# Patient Record
Sex: FEMALE | Race: BLACK OR AFRICAN AMERICAN | NOT HISPANIC OR LATINO | Employment: UNEMPLOYED | URBAN - METROPOLITAN AREA
[De-identification: names, ages, dates, MRNs, and addresses within clinical notes are randomized per-mention and may not be internally consistent; named-entity substitution may affect disease eponyms.]

---

## 2020-06-15 ENCOUNTER — HOSPITAL ENCOUNTER (EMERGENCY)
Facility: CLINIC | Age: 40
Discharge: HOME | End: 2020-06-15
Attending: EMERGENCY MEDICINE | Admitting: EMERGENCY MEDICINE
Payer: COMMERCIAL

## 2020-06-15 ENCOUNTER — APPOINTMENT (OUTPATIENT)
Dept: GENERAL RADIOLOGY | Facility: CLINIC | Age: 40
End: 2020-06-15
Attending: EMERGENCY MEDICINE
Payer: COMMERCIAL

## 2020-06-15 VITALS
SYSTOLIC BLOOD PRESSURE: 102 MMHG | TEMPERATURE: 97.8 F | DIASTOLIC BLOOD PRESSURE: 52 MMHG | RESPIRATION RATE: 16 BRPM | HEART RATE: 62 BPM | OXYGEN SATURATION: 100 %

## 2020-06-15 DIAGNOSIS — M79.671 RIGHT FOOT PAIN: ICD-10-CM

## 2020-06-15 PROCEDURE — 73630 X-RAY EXAM OF FOOT: CPT | Mod: RT

## 2020-06-15 PROCEDURE — 99283 EMERGENCY DEPT VISIT LOW MDM: CPT | Mod: Z6 | Performed by: EMERGENCY MEDICINE

## 2020-06-15 PROCEDURE — 99283 EMERGENCY DEPT VISIT LOW MDM: CPT

## 2020-06-15 RX ORDER — IBUPROFEN 600 MG/1
600 TABLET, FILM COATED ORAL EVERY 6 HOURS PRN
Qty: 20 TABLET | Refills: 0 | Status: SHIPPED | OUTPATIENT
Start: 2020-06-15 | End: 2022-11-07

## 2020-06-15 ASSESSMENT — ENCOUNTER SYMPTOMS
COLOR CHANGE: 0
ABDOMINAL PAIN: 0
NECK STIFFNESS: 0
DIFFICULTY URINATING: 0
EYE REDNESS: 0
ARTHRALGIAS: 0
SHORTNESS OF BREATH: 0
FEVER: 0
CONFUSION: 0
HEADACHES: 0

## 2020-06-15 NOTE — ED AVS SNAPSHOT
[facility], [place], Emergency Department  [address]  [facility] [place] [address]  Phone:  [phone]  Fax:  [phone]                                    Nadia Egan   MRN: [id number]    Department:  [facility], Emergency Department   Date of Visit:  6/15/2020           After Visit Summary Signature Page    I have received my discharge instructions, and my questions have been answered. I have discussed any challenges I see with this plan with the nurse or doctor.    ..........................................................................................................................................  Patient/Patient Representative Signature      ..........................................................................................................................................  Patient Representative Print Name and Relationship to Patient    ..................................................               ................................................  Date                                   Time    ..........................................................................................................................................  Reviewed by Signature/Title    ...................................................              ..............................................  Date                                               Time          22EPIC Rev 08/18

## 2020-06-15 NOTE — ED TRIAGE NOTES
Pt complaining of mid right foot pain [place].  Pt states no known injury.

## 2020-06-15 NOTE — ED PROVIDER NOTES
[facility] EMERGENCY DEPARTMENT ([facility])     Mónica 15, 2020    History     Chief Complaint   Patient presents with     Foot Pain     HPI  Nadia Egan is a 39 year old female with a past medical history significant for anemia, gastric reflux and vitamin D deficiency who presents to the Emergency Department for evaluation of mid right foot pain without injury.    Patient reports she was just walking and possibly slipped. Patient [place]. Patient reports pain since yesterday.  Patient denies history of right foot pain.  Patient notes some minor swelling in the area.  Patient recalls taking 2 Tylenol yesterday and this morning at 9am.       PAST MEDICAL HISTORY: History reviewed. No pertinent past medical history.    PAST SURGICAL HISTORY: History reviewed. No pertinent surgical history.    Past medical history, past surgical history, medications, and allergies were reviewed with the patient. Additional pertinent items: None    FAMILY HISTORY: No family history on file.    SOCIAL HISTORY:   Social History     Tobacco Use     Smoking status: Never Smoker     Smokeless tobacco: Never Used   Substance Use Topics     Alcohol use: No     Social history was reviewed with the patient. Additional pertinent items: None      Discharge Medication List as of 6/15/2020  3:02 PM      START taking these medications    Details   ibuprofen (ADVIL/MOTRIN) 600 MG tablet Take 1 tablet (600 mg) by mouth every 6 hours as needed for pain, Disp-20 tablet,R-0, E-Prescribe                Allergies   Allergen Reactions     Chicken-Derived Products (Egg) Nausea and Vomiting     Nuts Itching        Review of Systems   Constitutional: Negative for fever.   HENT: Negative for congestion.    Eyes: Negative for redness.   Respiratory: Negative for shortness of breath.    Cardiovascular: Negative for chest pain.   Gastrointestinal: Negative for abdominal pain.   Genitourinary:  Negative for difficulty urinating.   Musculoskeletal: Negative for arthralgias and neck stiffness.   Skin: Negative for color change.   Neurological: Negative for headaches.   Psychiatric/Behavioral: Negative for confusion.     A complete review of systems was performed with pertinent positives and negatives noted in the HPI, and all other systems negative.    Physical Exam   BP: 126/72  Pulse: 98  Temp: 97.7  F (36.5  C)  Resp: 16  SpO2: 94 %      Physical Exam  Constitutional:       General: She is not in acute distress.     Appearance: She is not diaphoretic.   HENT:      Head: Atraumatic.      Mouth/Throat:      Pharynx: No oropharyngeal exudate.   Eyes:      General: No scleral icterus.     Pupils: Pupils are equal, round, and reactive to light.   Cardiovascular:      Heart sounds: Normal heart sounds.   Pulmonary:      Effort: No respiratory distress.      Breath sounds: Normal breath sounds.   Abdominal:      General: Bowel sounds are normal.      Palpations: Abdomen is soft.      Tenderness: There is no abdominal tenderness.   Musculoskeletal:         General: No tenderness.        Feet:    Skin:     General: Skin is warm.      Findings: No rash.         ED Course   1:11 PM  The patient was seen and examined by Dr. Harley in Room ED17.        Procedures                           Results for orders placed or performed during the hospital encounter of 06/15/20 (from the past 24 hour(s))   Foot  XR, G/E 3 views, right    Narrative    FOOT THREE VIEWS RIGHT  6/15/2020 2:00 PM     HISTORY: Pain, swelling    COMPARISON: None.      Impression    IMPRESSION: No acute fracture or malalignment. Mild first MTP joint  degenerative changes with bony bunion. Small Achilles calcaneal  enthesophyte.    RITIKA SHEARER MD     Medications - No data to display          Assessments & Plan (with Medical Decision Making)   39-year-old female presents for evaluation of right foot pain and swelling after extensive walking yesterday.   Differential includes subacute or occult fracture, metatarsalgia, Hyman's neuroma, sprain, contusion.  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  I suspect metatarsalgia and have recommended metatarsal pad and ibuprofen with follow-up by podiatry.    I have reviewed the nursing notes.    I have reviewed the findings, diagnosis, plan and need for follow up with the patient.    Discharge Medication List as of 6/15/2020  3:02 PM      START taking these medications    Details   ibuprofen (ADVIL/MOTRIN) 600 MG tablet Take 1 tablet (600 mg) by mouth every 6 hours as needed for pain, Disp-20 tablet,R-0, E-Prescribe             Final diagnoses:   Right foot pain   IGiovani, am serving as a trained medical scribe to document services personally performed by Kameron Harley MD, based on the provider's statements to me.     Kameron SPIVEY MD, was physically present and have reviewed and verified the accuracy of this note documented by Giovani Vasquez.      6/15/2020   [facility], [place], EMERGENCY DEPARTMENT     Kameron Harley MD  06/15/20 [id number]

## 2021-05-16 ENCOUNTER — HOSPITAL ENCOUNTER (EMERGENCY)
Facility: CLINIC | Age: 41
Discharge: HOME | End: 2021-05-16
Attending: EMERGENCY MEDICINE | Admitting: EMERGENCY MEDICINE
Payer: COMMERCIAL

## 2021-05-16 VITALS
DIASTOLIC BLOOD PRESSURE: 78 MMHG | RESPIRATION RATE: 16 BRPM | TEMPERATURE: 99.4 F | OXYGEN SATURATION: 100 % | HEART RATE: 78 BPM | SYSTOLIC BLOOD PRESSURE: 124 MMHG

## 2021-05-16 DIAGNOSIS — J02.9 SORE THROAT: ICD-10-CM

## 2021-05-16 LAB
DEPRECATED S PYO AG THROAT QL EIA: NEGATIVE
SPECIMEN SOURCE: NORMAL

## 2021-05-16 PROCEDURE — 999N001174 HC STATISTIC STREP A RAPID: Performed by: EMERGENCY MEDICINE

## 2021-05-16 PROCEDURE — 99283 EMERGENCY DEPT VISIT LOW MDM: CPT | Performed by: EMERGENCY MEDICINE

## 2021-05-16 PROCEDURE — 99282 EMERGENCY DEPT VISIT SF MDM: CPT | Performed by: EMERGENCY MEDICINE

## 2021-05-16 PROCEDURE — 87651 STREP A DNA AMP PROBE: CPT | Performed by: EMERGENCY MEDICINE

## 2021-05-16 ASSESSMENT — ENCOUNTER SYMPTOMS
COUGH: 0
FEVER: 0
CONFUSION: 0
NECK STIFFNESS: 0
CHILLS: 0
COLOR CHANGE: 0
RHINORRHEA: 0
VOMITING: 0
SORE THROAT: 1
TROUBLE SWALLOWING: 1
HEADACHES: 1
FATIGUE: 1
NAUSEA: 0
DIFFICULTY URINATING: 0
ABDOMINAL PAIN: 0
SHORTNESS OF BREATH: 0
DIARRHEA: 0
EYE REDNESS: 0
ARTHRALGIAS: 0

## 2021-05-16 NOTE — ED TRIAGE NOTES
Pt complaining of sinus drainage, throat irritation and HA for the past few days.  Pt states no relief from OTC meds.    Pt would like a [facility]  when talking to the doctor.

## 2021-05-17 ENCOUNTER — TELEPHONE (OUTPATIENT)
Dept: EMERGENCY MEDICINE | Facility: CLINIC | Age: 41
End: 2021-05-17

## 2021-05-17 DIAGNOSIS — J02.0 STREPTOCOCCAL PHARYNGITIS: ICD-10-CM

## 2021-05-17 LAB
SPECIMEN SOURCE: ABNORMAL
STREP GROUP A PCR: ABNORMAL

## 2021-05-17 NOTE — DISCHARGE INSTRUCTIONS
You have been seen in the ER today for a sore throat and a headache.  This is usually caused by a virus.  We have tested you for strep which is a bacteria that can cause sore throat.  Your strep test is negative.  We advise that you be tested for Covid as this can cause a sore throat as well but you have declined testing at this time.  We recommend that you treat herself symptomatically with Tylenol and ibuprofen, you can also make sure to drink plenty of fluids to keep hydrated.  Follow-up with your primary clinic for any ongoing issues, return to the emergency department for worsening symptoms.

## 2021-05-17 NOTE — ED NOTES
Pt declined vital signs as concern to leave due to parking meter expiring. She was given discharge instructions. She understands that she chose not to have covid test, and that she should take precautions. No questions or concerns.

## 2021-05-17 NOTE — ED NOTES
"Pt is declining covid test, \"I don't have it, I don't want that test.\" RN explained how some people are asymptomatic, others have different symptoms than what is being told, and that her sore throat could be covid. She states, \"I don't have covid, I don't want it.\"    MD made aware.   "

## 2021-05-17 NOTE — TELEPHONE ENCOUNTER
[facility] Emergency Department Lab result notification [Adult-Female]    [place] ED lab result protocol used  Group A Strep    Reason for call  Notify of lab results, assess symptoms,  review ED providers recommendations/discharge instructions (if necessary) and advise per ED lab result f/u protocol    Lab Result (including Rx patient on, if applicable)  Group A Streptococcus PCR is POSITIVE.  [facility] Emergency Dept discharge antibiotic: None  Recommendations in Treatment per [facility] ED Lab Result Strep group A protocol.   Information table from Emergency Dept Provider visit on 5/16/21  Symptoms reported at ED visit (Chief complaint, HPI) Headache      Sinusitis      HPI  Nadia Egan is a 40 year old female who presents to the emergency department today complaining of a sore throat and a headache. Patient reports that she has had a sore throat for approximately 3 days. She endorses painful swallowing but is able to handle her secretions. She denies any nasal congestion or rhinorrhea but does report she has a frontal headache. Denies any documented fevers at home. No cough or chest pain. She has occasional shortness of breath which is not present right now. No abdominal pain, nausea, vomiting, or diarrhea. No dysuria or hematuria. She has not had her Covid vaccine. As far as she knows she has not had recent exposure to Covid. She was in [place] [facility] recently and came back from [place] a few weeks ago.     Significant Medical hx, if applicable (i.e. CKD, diabetes) None   Allergies Allergies   Allergen Reactions     Chicken-Derived Products (Egg) Nausea and Vomiting     Nuts Itching      Weight, if applicable Wt Readings from Last 2 Encounters:   No data found for Wt      Coumadin/Warfarin [Yes /No] No   Creatinine Level (mg/dl) Creatinine   Date Value Ref Range Status   07/11/2014 0.50 (L) 0.52 - 1.04 mg/dL Final      Creatinine clearance (ml/min), if applicable Creatinine clearance cannot be  calculated (Patient's most recent lab result is older than the maximum 30 days allowed.)   Pregnant (Yes/No/NA) ?   Breastfeeding (Yes/No/NA) ?   ED providers Impression and Plan (applicable information) Patient presents to the emergency department today complaining of a headache and throat pain. Vital signs are within normal limits. She has mild erythema the posterior oropharynx but posterior structures are symmetric. Differential diagnosis could include any sort of viral upper respiratory infection. Covid would be possible. Also need to consider streptococcal pharyngitis. Do not suspect tonsillitis at this time. Do not suspect peritonsillar abscess or deeper space abscess.     Rapid strep was negative.  I did advise doing a Covid swab and I did order it but the patient declined to be swabbed, stating that she knows she does not have Covid.  We educated her that she very well could have Covid and recommended that she be tested but she declined.  She will be discharged at this time.  Symptomatic care advised for sore throat.  Follow-up with primary clinic for ongoing complaints.  Return to the ED if worse.   ED diagnosis Sore throat   ED provider Nena Aguilar MD      RN Assessment (Patient s current Symptoms), include time called.  [Insert Left message here if message left]  10:53AM: Left voicemail message with assistance of [employer]  #[phone] requesting a call back to [facility] ED Lab Result RN at [phone].  RN is available every day between 10 a.m. and 6:30 p.m    Miriam Kline RN  [facility] [employer] [place]  Emergency Dept Lab Result RN  Ph# [phone]     Copy of Lab result   Group A Streptococcus PCR Throat Swab  Order: [id number]  Status:  Final result   Visible to patient:  No (inaccessible in MyChart) Dx:  Sore throat  Specimen Information: Throat          Ref Range & Units 1d ago Resulting Agency    Specimen Description  Throat  [facility]  [facility]    Strep Group A PCR NDET^Not Detected Detected, Abnormal ResultAbnormal   [facility]   Comment: Group A Streptococcus DNA detected.   FDA approved assay performed using [employer] GeneXpert real-time PCR.          Specimen Collected: 05/16/21 10:44 PM Last Resulted: 05/17/21 12:53 AM

## 2021-05-17 NOTE — ED PROVIDER NOTES
[facility] [place] EMERGENCY DEPARTMENT ([facility])   May 16, 2021       History     Chief Complaint   Patient presents with     Headache     Sinusitis     HPI  Nadia Egan is a 40 year old female who presents to the emergency department today complaining of a sore throat and a headache. Patient reports that she has had a sore throat for approximately 3 days. She endorses painful swallowing but is able to handle her secretions. She denies any nasal congestion or rhinorrhea but does report she has a frontal headache. Denies any documented fevers at home. No cough or chest pain. She has occasional shortness of breath which is not present right now. No abdominal pain, nausea, vomiting, or diarrhea. No dysuria or hematuria. She has not had her Covid vaccine. As far as she knows she has not had recent exposure to Covid. She was in [facility] [facility] recently and came back from [place] a few weeks ago.    PAST MEDICAL HISTORY: No past medical history on file.    PAST SURGICAL HISTORY: No past surgical history on file.    Past medical history, past surgical history, medications, and allergies were reviewed with the patient. Additional pertinent items: None    FAMILY HISTORY: No family history on file.    SOCIAL HISTORY:   Social History     Tobacco Use     Smoking status: Never Smoker     Smokeless tobacco: Never Used   Substance Use Topics     Alcohol use: No     Social history was reviewed with the patient. Additional pertinent items: None      Patient's Medications   New Prescriptions    No medications on file   Previous Medications    IBUPROFEN (ADVIL/MOTRIN) 600 MG TABLET    Take 1 tablet (600 mg) by mouth every 6 hours as needed for pain   Modified Medications    No medications on file   Discontinued Medications    No medications on file          Allergies   Allergen Reactions     Chicken-Derived Products (Egg) Nausea and Vomiting     Nuts Itching        Review of Systems   Constitutional: Positive for fatigue. Negative for  chills and fever.   HENT: Positive for sore throat and trouble swallowing. Negative for congestion and rhinorrhea.    Eyes: Negative for redness.   Respiratory: Negative for cough and shortness of breath.    Cardiovascular: Negative for chest pain.   Gastrointestinal: Negative for abdominal pain, diarrhea, nausea and vomiting.   Genitourinary: Negative for difficulty urinating.   Musculoskeletal: Negative for arthralgias and neck stiffness.   Skin: Negative for color change.   Neurological: Positive for headaches.   Psychiatric/Behavioral: Negative for confusion.        Difficulty sleeping   All other systems reviewed and are negative.    A complete review of systems was performed with pertinent positives and negatives noted in the HPI, and all other systems negative.    Physical Exam   BP: 113/69  Pulse: 95  Temp: 99.4  F (37.4  C)  Resp: 20  SpO2: 100 %      Physical Exam  Vitals signs and nursing note reviewed.   Constitutional:       General: She is not in acute distress.     Appearance: She is well-developed. She is not diaphoretic.      Comments: Alert, cooperative, NAD   HENT:      Head: Normocephalic and atraumatic.      Nose: No congestion or rhinorrhea.      Mouth/Throat:      Mouth: Mucous membranes are moist.      Comments: 3+ tonsillar hypertrophy bilaterally. Posterior structures are symmetric, no uvular deviation. No exudate visible. Mild posterior pharyngeal erythema. Handling secretions well. No stridor  Eyes:      Conjunctiva/sclera: Conjunctivae normal.      Pupils: Pupils are equal, round, and reactive to light.   Cardiovascular:      Rate and Rhythm: Normal rate and regular rhythm.      Heart sounds: Normal heart sounds.   Pulmonary:      Effort: Pulmonary effort is normal. No respiratory distress.      Breath sounds: Normal breath sounds. No wheezing or rales.   Abdominal:      General: Bowel sounds are normal. There is no distension.      Palpations: Abdomen is soft.      Tenderness: There is  no abdominal tenderness.   Skin:     General: Skin is warm and dry.   Neurological:      Mental Status: She is alert and oriented to person, place, and time.         ED Course        Procedures                            Results for orders placed or performed during the hospital encounter of 05/16/21 (from the past 24 hour(s))   Streptococcus A Rapid Scr w Reflx to PCR    Specimen: Throat   Result Value Ref Range    Strep Specimen Description Throat     Streptococcus Group A Rapid Screen Negative NEG^Negative     Medications - No data to display          Assessments & Plan (with Medical Decision Making)   Patient presents to the emergency department today complaining of a headache and throat pain. Vital signs are within normal limits. She has mild erythema the posterior oropharynx but posterior structures are symmetric. Differential diagnosis could include any sort of viral upper respiratory infection. Covid would be possible. Also need to consider streptococcal pharyngitis. Do not suspect tonsillitis at this time. Do not suspect peritonsillar abscess or deeper space abscess.    Rapid strep was negative.  I did advise doing a Covid swab and I did order it but the patient declined to be swabbed, stating that she knows she does not have Covid.  We educated her that she very well could have Covid and recommended that she be tested but she declined.  She will be discharged at this time.  Symptomatic care advised for sore throat.  Follow-up with primary clinic for ongoing complaints.  Return to the ED if worse.    I have reviewed the nursing note vitals.     I have reviewed the findings, diagnosis, plan and need for follow up with the patient.    New Prescriptions    No medications on file       Final diagnoses:   Sore throat       5/16/2021   [facility] EMERGENCY DEPARTMENT     Nena Aguilar MD  05/16/21 0285

## 2021-05-17 NOTE — ED NOTES
Pt states that she had the same symptoms over a month ago, and was given medication that made her feel better, but the symptoms are now back. Upon asking, patient states that she did not complete her antibiotics.

## 2021-05-18 ENCOUNTER — APPOINTMENT (OUTPATIENT)
Dept: INTERPRETER SERVICES | Facility: CLINIC | Age: 41
End: 2021-05-18
Payer: COMMERCIAL

## 2021-05-18 RX ORDER — PENICILLIN V POTASSIUM 500 MG/1
500 TABLET, FILM COATED ORAL 2 TIMES DAILY
Qty: 20 TABLET | Refills: 0 | Status: SHIPPED | OUTPATIENT
Start: 2021-05-18 | End: 2021-05-28

## 2021-05-18 NOTE — RESULT ENCOUNTER NOTE
.[facility] notified through [facility]   Treated with Penicillin V Potassium (VEETID) 500 MG tablet,  1 tablet (500 mg) by mouth 2 times daily for 10 days.  Rx sent to [facility]

## 2021-05-18 NOTE — TELEPHONE ENCOUNTER
.[facility] notified through [facility]  of positive Strep Group A PCR results  Treated with Penicillin V Potassium (VEETID) 500 MG tablet,  1 tablet (500 mg) by mouth 2 times daily for 10 days.  [facility]    Contact your PCP clinic or return to the Emergency department if your:    Symptoms worsen or other concerning symptom's.    Herbert Walker RN  [employer] [facility]  Emergency Dept Lab Result RN  Ph# [phone]

## 2022-04-26 ENCOUNTER — LAB REQUISITION (OUTPATIENT)
Dept: LAB | Facility: CLINIC | Age: 42
End: 2022-04-26
Payer: COMMERCIAL

## 2022-04-26 DIAGNOSIS — G47.00 INSOMNIA, UNSPECIFIED: ICD-10-CM

## 2022-04-26 DIAGNOSIS — Z00.00 ENCOUNTER FOR GENERAL ADULT MEDICAL EXAMINATION WITHOUT ABNORMAL FINDINGS: ICD-10-CM

## 2022-04-26 LAB
ALBUMIN SERPL-MCNC: 3.2 G/DL (ref 3.4–5)
ALP SERPL-CCNC: 215 U/L (ref 40–150)
ALT SERPL W P-5'-P-CCNC: 87 U/L (ref 0–50)
ANION GAP SERPL CALCULATED.3IONS-SCNC: 8 MMOL/L (ref 3–14)
AST SERPL W P-5'-P-CCNC: 64 U/L (ref 0–45)
BILIRUB SERPL-MCNC: 0.3 MG/DL (ref 0.2–1.3)
BUN SERPL-MCNC: 7 MG/DL (ref 7–30)
CALCIUM SERPL-MCNC: 9.3 MG/DL (ref 8.5–10.1)
CHLORIDE BLD-SCNC: 106 MMOL/L (ref 94–109)
CHOLEST SERPL-MCNC: 214 MG/DL
CO2 SERPL-SCNC: 26 MMOL/L (ref 20–32)
CREAT SERPL-MCNC: 0.52 MG/DL (ref 0.52–1.04)
ERYTHROCYTE [DISTWIDTH] IN BLOOD BY AUTOMATED COUNT: 17.5 % (ref 10–15)
FASTING STATUS PATIENT QL REPORTED: YES
GFR SERPL CREATININE-BSD FRML MDRD: >90 ML/MIN/1.73M2
GLUCOSE BLD-MCNC: 92 MG/DL (ref 70–99)
HCT VFR BLD AUTO: 36.7 % (ref 35–47)
HDLC SERPL-MCNC: 55 MG/DL
HGB BLD-MCNC: 11.2 G/DL (ref 11.7–15.7)
LDLC SERPL CALC-MCNC: 142 MG/DL
MCH RBC QN AUTO: 23.9 PG (ref 26.5–33)
MCHC RBC AUTO-ENTMCNC: 30.5 G/DL (ref 31.5–36.5)
MCV RBC AUTO: 78 FL (ref 78–100)
NONHDLC SERPL-MCNC: 159 MG/DL
PLATELET # BLD AUTO: 253 10E3/UL (ref 150–450)
POTASSIUM BLD-SCNC: 3.9 MMOL/L (ref 3.4–5.3)
PROT SERPL-MCNC: 8 G/DL (ref 6.8–8.8)
RBC # BLD AUTO: 4.69 10E6/UL (ref 3.8–5.2)
SODIUM SERPL-SCNC: 140 MMOL/L (ref 133–144)
TRIGL SERPL-MCNC: 83 MG/DL
WBC # BLD AUTO: 6.1 10E3/UL (ref 4–11)

## 2022-04-26 PROCEDURE — 80053 COMPREHEN METABOLIC PANEL: CPT | Mod: ORL | Performed by: REGISTERED NURSE

## 2022-04-26 PROCEDURE — 86481 TB AG RESPONSE T-CELL SUSP: CPT | Mod: ORL | Performed by: REGISTERED NURSE

## 2022-04-26 PROCEDURE — 85027 COMPLETE CBC AUTOMATED: CPT | Mod: ORL | Performed by: REGISTERED NURSE

## 2022-04-26 PROCEDURE — 80061 LIPID PANEL: CPT | Mod: ORL | Performed by: REGISTERED NURSE

## 2022-04-28 LAB
GAMMA INTERFERON BACKGROUND BLD IA-ACNC: 0.02 IU/ML
M TB IFN-G BLD-IMP: NEGATIVE
M TB IFN-G CD4+ BCKGRND COR BLD-ACNC: 6.03 IU/ML
MITOGEN IGNF BCKGRD COR BLD-ACNC: 0.01 IU/ML
MITOGEN IGNF BCKGRD COR BLD-ACNC: 0.01 IU/ML
QUANTIFERON MITOGEN: 6.05 IU/ML
QUANTIFERON NIL TUBE: 0.02 IU/ML
QUANTIFERON TB1 TUBE: 0.03 IU/ML
QUANTIFERON TB2 TUBE: 0.03

## 2022-05-21 ENCOUNTER — APPOINTMENT (OUTPATIENT)
Dept: CT IMAGING | Facility: CLINIC | Age: 42
End: 2022-05-21
Attending: EMERGENCY MEDICINE
Payer: COMMERCIAL

## 2022-05-21 ENCOUNTER — HOSPITAL ENCOUNTER (EMERGENCY)
Facility: CLINIC | Age: 42
Discharge: HOME | End: 2022-05-21
Attending: EMERGENCY MEDICINE
Payer: COMMERCIAL

## 2022-05-21 VITALS
HEART RATE: 81 BPM | RESPIRATION RATE: 16 BRPM | SYSTOLIC BLOOD PRESSURE: 113 MMHG | DIASTOLIC BLOOD PRESSURE: 72 MMHG | OXYGEN SATURATION: 100 % | TEMPERATURE: 98.3 F

## 2022-05-21 DIAGNOSIS — G43.001 MIGRAINE WITHOUT AURA AND WITH STATUS MIGRAINOSUS, NOT INTRACTABLE: Primary | ICD-10-CM

## 2022-05-21 LAB
ANION GAP SERPL CALCULATED.3IONS-SCNC: 7 MMOL/L (ref 3–14)
BASOPHILS # BLD MANUAL: 0 10E3/UL (ref 0–0.2)
BASOPHILS NFR BLD MANUAL: 0 %
BUN SERPL-MCNC: 6 MG/DL (ref 7–30)
CALCIUM SERPL-MCNC: 9.1 MG/DL (ref 8.5–10.1)
CHLORIDE BLD-SCNC: 108 MMOL/L (ref 94–109)
CO2 SERPL-SCNC: 25 MMOL/L (ref 20–32)
CREAT SERPL-MCNC: 0.39 MG/DL (ref 0.52–1.04)
EOSINOPHIL # BLD MANUAL: 0.2 10E3/UL (ref 0–0.7)
EOSINOPHIL NFR BLD MANUAL: 3 %
ERYTHROCYTE [DISTWIDTH] IN BLOOD BY AUTOMATED COUNT: 14.8 % (ref 10–15)
GFR SERPL CREATININE-BSD FRML MDRD: >90 ML/MIN/1.73M2
GLUCOSE BLD-MCNC: 112 MG/DL (ref 70–99)
HCT VFR BLD AUTO: 37.5 % (ref 35–47)
HGB BLD-MCNC: 11.6 G/DL (ref 11.7–15.7)
LYMPHOCYTES # BLD MANUAL: 2.1 10E3/UL (ref 0.8–5.3)
LYMPHOCYTES NFR BLD MANUAL: 30 %
MCH RBC QN AUTO: 24.2 PG (ref 26.5–33)
MCHC RBC AUTO-ENTMCNC: 30.9 G/DL (ref 31.5–36.5)
MCV RBC AUTO: 78 FL (ref 78–100)
MONOCYTES # BLD MANUAL: 0.5 10E3/UL (ref 0–1.3)
MONOCYTES NFR BLD MANUAL: 7 %
NEUTROPHILS # BLD MANUAL: 4.1 10E3/UL (ref 1.6–8.3)
NEUTROPHILS NFR BLD MANUAL: 60 %
PLAT MORPH BLD: NORMAL
PLATELET # BLD AUTO: 296 10E3/UL (ref 150–450)
POTASSIUM BLD-SCNC: 3.5 MMOL/L (ref 3.4–5.3)
RBC # BLD AUTO: 4.8 10E6/UL (ref 3.8–5.2)
RBC MORPH BLD: NORMAL
SODIUM SERPL-SCNC: 140 MMOL/L (ref 133–144)
WBC # BLD AUTO: 6.9 10E3/UL (ref 4–11)

## 2022-05-21 PROCEDURE — 99285 EMERGENCY DEPT VISIT HI MDM: CPT | Mod: 25 | Performed by: EMERGENCY MEDICINE

## 2022-05-21 PROCEDURE — 36415 COLL VENOUS BLD VENIPUNCTURE: CPT | Performed by: EMERGENCY MEDICINE

## 2022-05-21 PROCEDURE — 96375 TX/PRO/DX INJ NEW DRUG ADDON: CPT | Performed by: EMERGENCY MEDICINE

## 2022-05-21 PROCEDURE — 96374 THER/PROPH/DIAG INJ IV PUSH: CPT | Performed by: EMERGENCY MEDICINE

## 2022-05-21 PROCEDURE — 85027 COMPLETE CBC AUTOMATED: CPT | Performed by: EMERGENCY MEDICINE

## 2022-05-21 PROCEDURE — 82374 ASSAY BLOOD CARBON DIOXIDE: CPT | Performed by: EMERGENCY MEDICINE

## 2022-05-21 PROCEDURE — 99284 EMERGENCY DEPT VISIT MOD MDM: CPT | Performed by: EMERGENCY MEDICINE

## 2022-05-21 PROCEDURE — 70450 CT HEAD/BRAIN W/O DYE: CPT

## 2022-05-21 PROCEDURE — 85007 BL SMEAR W/DIFF WBC COUNT: CPT | Performed by: EMERGENCY MEDICINE

## 2022-05-21 PROCEDURE — 82310 ASSAY OF CALCIUM: CPT | Performed by: EMERGENCY MEDICINE

## 2022-05-21 PROCEDURE — 250N000011 HC RX IP 250 OP 636: Performed by: EMERGENCY MEDICINE

## 2022-05-21 RX ORDER — DIPHENHYDRAMINE HYDROCHLORIDE 50 MG/ML
25 INJECTION INTRAMUSCULAR; INTRAVENOUS ONCE
Status: COMPLETED | OUTPATIENT
Start: 2022-05-21 | End: 2022-05-21

## 2022-05-21 RX ORDER — KETOROLAC TROMETHAMINE 30 MG/ML
30 INJECTION, SOLUTION INTRAMUSCULAR; INTRAVENOUS ONCE
Status: COMPLETED | OUTPATIENT
Start: 2022-05-21 | End: 2022-05-21

## 2022-05-21 RX ORDER — DIPHENHYDRAMINE HCL 25 MG
25-50 TABLET ORAL EVERY 6 HOURS PRN
Qty: 30 TABLET | Refills: 0 | Status: SHIPPED | OUTPATIENT
Start: 2022-05-21 | End: 2022-09-12

## 2022-05-21 RX ORDER — NAPROXEN 500 MG/1
500 TABLET ORAL 2 TIMES DAILY PRN
Qty: 30 TABLET | Refills: 0 | Status: SHIPPED | OUTPATIENT
Start: 2022-05-21 | End: 2022-06-05

## 2022-05-21 RX ADMIN — DIPHENHYDRAMINE HYDROCHLORIDE 25 MG: 50 INJECTION, SOLUTION INTRAMUSCULAR; INTRAVENOUS at 19:36

## 2022-05-21 RX ADMIN — PROCHLORPERAZINE EDISYLATE 10 MG: 5 INJECTION INTRAMUSCULAR; INTRAVENOUS at 19:35

## 2022-05-21 RX ADMIN — KETOROLAC TROMETHAMINE 30 MG: 30 INJECTION, SOLUTION INTRAMUSCULAR; INTRAVENOUS at 19:35

## 2022-05-21 ASSESSMENT — ENCOUNTER SYMPTOMS
HEADACHES: 1
SLEEP DISTURBANCE: 1
VOMITING: 0
NAUSEA: 1

## 2022-05-21 NOTE — ED TRIAGE NOTES
Reports sinus headache, took 600 mg ibuprofen 1 hours ago with no relief. Saw ENT a month ago and was given nasal spray that has given her no relief. Unsure of name of this spray

## 2022-05-21 NOTE — ED PROVIDER NOTES
[facility] EMERGENCY DEPARTMENT ([facility])    5/21/22        History     Chief Complaint   Patient presents with     Headache     HPI  Nadia Egan is a 41 year old female with past medical history significant for anemia, gastric reflux, vitamin D deficiency, and cervicalgia who presents to the ED for evaluation of headache.  Patient states that the headache starts in her nose, travels around her bilateral temples, and to the back of her head.  She reports that the symptoms have been ongoing for 6 months.  She occasionally gets nausea with the headaches, no vomiting.  She also notes that she has not been sleeping well and wakes up after 10-20 minutes.  She states she has been taking ibuprofen for 6 days with no relief.  Patient reports she saw ENT a month ago was given nasal spray with no relief.  She also reports visual disturbance, saying she sees bright lights flashing in the morning.  States she has not seen a neurologist.    Past Medical History  Chronic headaches, sinusitis    diphenhydrAMINE (BENADRYL) 25 MG tablet  ibuprofen (ADVIL/MOTRIN) 600 MG tablet  naproxen (NAPROSYN) 500 MG tablet      Allergies   Allergen Reactions     Chicken-Derived Products (Egg) Nausea and Vomiting     Nuts Itching     Family History  No family history on file.  Social History   Social History     Tobacco Use     Smoking status: Never Smoker     Smokeless tobacco: Never Used   Substance Use Topics     Alcohol use: No     Drug use: No      Past medical history, past surgical history, medications, allergies, family history, and social history were reviewed with the patient. No additional pertinent items.       Review of Systems   Eyes: Positive for visual disturbance.   Gastrointestinal: Positive for nausea. Negative for vomiting.   Neurological: Positive for headaches.   Psychiatric/Behavioral: Positive for sleep disturbance.   All other systems reviewed and are negative.    A complete review of systems was performed  with pertinent positives and negatives noted in the HPI, and all other systems negative.    Physical Exam   BP: 113/72  Pulse: 81  Temp: 98.3  F (36.8  C)  Resp: 16  SpO2: 100 %  Physical Exam  Constitutional:       General: She is not in acute distress.     Appearance: She is not diaphoretic.   HENT:      Head: Normocephalic and atraumatic.      Right Ear: External ear normal.      Left Ear: External ear normal.      Nose: Nose normal.   Eyes:      Extraocular Movements: Extraocular movements intact.      Conjunctiva/sclera: Conjunctivae normal.   Cardiovascular:      Rate and Rhythm: Normal rate and regular rhythm.      Heart sounds: Normal heart sounds.   Pulmonary:      Effort: Pulmonary effort is normal. No respiratory distress.      Breath sounds: Normal breath sounds.   Abdominal:      General: There is no distension.      Palpations: Abdomen is soft.      Tenderness: There is no abdominal tenderness.   Musculoskeletal:         General: No swelling or deformity.      Cervical back: Normal range of motion and neck supple.   Skin:     General: Skin is warm and dry.   Neurological:      General: No focal deficit present.      Mental Status: Mental status is at baseline. She is disoriented.      Cranial Nerves: No cranial nerve deficit.      Sensory: No sensory deficit.      Coordination: Coordination normal.      Comments: Alert, oriented   Psychiatric:         Mood and Affect: Mood normal.         Behavior: Behavior normal.         ED Course     6:41 PM  The patient was seen and examined by Raghu Dupont DO in Room ED05.     Procedures       Results for orders placed or performed during the hospital encounter of 05/21/22   CT Head w/o Contrast     Status: None    Narrative    EXAM: CT HEAD W/O CONTRAST  LOCATION: [facility]  DATE/TIME: 5/21/2022 7:01 PM    INDICATION: Headache, classic migraine  COMPARISON: None.  TECHNIQUE: Routine CT Head without IV  contrast. Multiplanar reformats. Dose reduction techniques were used.    FINDINGS:  INTRACRANIAL CONTENTS: No intracranial hemorrhage, extraaxial collection, or mass effect.  No CT evidence of acute infarct. Normal parenchymal attenuation. Normal ventricles and sulci. Partial empty sella morphology.    VISUALIZED ORBITS/SINUSES/MASTOIDS: Mild tortuosity of both optic nerve sheaths. Mild mucosal thickening scattered about the paranasal sinuses. No middle ear or mastoid effusion.    BONES/SOFT TISSUES: No acute abnormality.      Impression    IMPRESSION:  1.  No acute intracranial process.  2.  Presence of mild tortuosity of the optic nerve sheaths and partial empty sella, raise the possibility of idiopathic intracranial hypertension.   Basic metabolic panel     Status: Abnormal   Result Value Ref Range    Sodium 140 133 - 144 mmol/L    Potassium 3.5 3.4 - 5.3 mmol/L    Chloride 108 94 - 109 mmol/L    Carbon Dioxide (CO2) 25 20 - 32 mmol/L    Anion Gap 7 3 - 14 mmol/L    Urea Nitrogen 6 (L) 7 - 30 mg/dL    Creatinine 0.39 (L) 0.52 - 1.04 mg/dL    Calcium 9.1 8.5 - 10.1 mg/dL    Glucose 112 (H) 70 - 99 mg/dL    GFR Estimate >90 >60 mL/min/1.73m2   CBC with platelets and differential     Status: Abnormal (Preliminary result)   Result Value Ref Range    WBC Count      RBC Count 4.80 3.80 - 5.20 10e6/uL    Hemoglobin 11.6 (L) 11.7 - 15.7 g/dL    Hematocrit 37.5 35.0 - 47.0 %    MCV 78 78 - 100 fL    MCH 24.2 (L) 26.5 - 33.0 pg    MCHC 30.9 (L) 31.5 - 36.5 g/dL    RDW 14.8 10.0 - 15.0 %    Platelet Count 296 150 - 450 10e3/uL   CBC with platelets differential     Status: Abnormal (In process)    Narrative    The following orders were created for panel order CBC with platelets differential.  Procedure                               Abnormality         Status                     ---------                               -----------         ------                     CBC with platelets and d...[[id number]]  Abnormal             Preliminary result           Please view results for these tests on the individual orders.              Results for orders placed or performed during the hospital encounter of 05/21/22   CT Head w/o Contrast     Status: None    Narrative    EXAM: CT HEAD W/O CONTRAST  LOCATION: [facility]  DATE/TIME: 5/21/2022 7:01 PM    INDICATION: Headache, classic migraine  COMPARISON: None.  TECHNIQUE: Routine CT Head without IV contrast. Multiplanar reformats. Dose reduction techniques were used.    FINDINGS:  INTRACRANIAL CONTENTS: No intracranial hemorrhage, extraaxial collection, or mass effect.  No CT evidence of acute infarct. Normal parenchymal attenuation. Normal ventricles and sulci. Partial empty sella morphology.    VISUALIZED ORBITS/SINUSES/MASTOIDS: Mild tortuosity of both optic nerve sheaths. Mild mucosal thickening scattered about the paranasal sinuses. No middle ear or mastoid effusion.    BONES/SOFT TISSUES: No acute abnormality.      Impression    IMPRESSION:  1.  No acute intracranial process.  2.  Presence of mild tortuosity of the optic nerve sheaths and partial empty sella, raise the possibility of idiopathic intracranial hypertension.   Basic metabolic panel     Status: Abnormal   Result Value Ref Range    Sodium 140 133 - 144 mmol/L    Potassium 3.5 3.4 - 5.3 mmol/L    Chloride 108 94 - 109 mmol/L    Carbon Dioxide (CO2) 25 20 - 32 mmol/L    Anion Gap 7 3 - 14 mmol/L    Urea Nitrogen 6 (L) 7 - 30 mg/dL    Creatinine 0.39 (L) 0.52 - 1.04 mg/dL    Calcium 9.1 8.5 - 10.1 mg/dL    Glucose 112 (H) 70 - 99 mg/dL    GFR Estimate >90 >60 mL/min/1.73m2   CBC with platelets and differential     Status: Abnormal (Preliminary result)   Result Value Ref Range    WBC Count      RBC Count 4.80 3.80 - 5.20 10e6/uL    Hemoglobin 11.6 (L) 11.7 - 15.7 g/dL    Hematocrit 37.5 35.0 - 47.0 %    MCV 78 78 - 100 fL    MCH 24.2 (L) 26.5 - 33.0 pg    MCHC 30.9 (L) 31.5 - 36.5 g/dL     RDW 14.8 10.0 - 15.0 %    Platelet Count 296 150 - 450 10e3/uL   CBC with platelets differential     Status: Abnormal (In process)    Narrative    The following orders were created for panel order CBC with platelets differential.  Procedure                               Abnormality         Status                     ---------                               -----------         ------                     CBC with platelets and d...[[id number]]  Abnormal            Preliminary result           Please view results for these tests on the individual orders.     Medications   prochlorperazine (COMPAZINE) injection 10 mg (10 mg Intravenous Given 5/21/22 1935)   ketorolac (TORADOL) injection 30 mg (30 mg Intravenous Given 5/21/22 1935)   diphenhydrAMINE (BENADRYL) injection 25 mg (25 mg Intravenous Given 5/21/22 1936)        Assessments & Plan (with Medical Decision Making)   41-year-old female presents to us with a chief complaint of headaches for several months.  Differential includes but not limited to migraine headaches, tension headaches, anemia, dehydration, chronic sinusitis.  Vital signs today were unremarkable.  Patient was given a combination of Toradol, Compazine, Benadryl that did help with the pain.  Neuro exam was normal.  CT scan was ordered and interpreted given the duration of the headaches.  No acute findings were seen on the CT scan however the possibility of idiopathic intracranial hypertension was demonstrated on the images.  Labs were reviewed and interpreted and were unremarkable.  We will give the patient a referral to the headache specialist to further address the possibility of intracranial hypertension causing her headaches also give her a prescription for Naprosyn and Benadryl as needed for headaches.      I have reviewed the nursing notes. I have reviewed the findings, diagnosis, plan and need for follow up with the patient.    New Prescriptions    DIPHENHYDRAMINE (BENADRYL) 25 MG TABLET    Take  1-2 tablets (25-50 mg) by mouth every 6 hours as needed for other (Headache)    NAPROXEN (NAPROSYN) 500 MG TABLET    Take 1 tablet (500 mg) by mouth 2 times daily as needed for headaches       Final diagnoses:   Migraine without aura and with status migrainosus, not intractable     Lexis SPIVEY, am serving as a trained medical scribe to document services personally performed by Raghu Dupont DO based on the provider's statements to me on May 21, 2022.  This document has been checked and approved by the attending provider.    I, Raghu Dupont DO, was physically present and have reviewed and verified the accuracy of this note documented by Lexis Sykes, medical scribe.      Raghu Dupont DO      --  Raghu BALTAZAR [facility] EMERGENCY DEPARTMENT  5/21/2022     Raghu Dupont DO  05/21/22 2013

## 2022-05-22 NOTE — DISCHARGE INSTRUCTIONS
Neurology clinic will contact you to set up an appointment to see headache specialist.  Return to the emergency department as needed

## 2022-09-12 ENCOUNTER — HOSPITAL ENCOUNTER (EMERGENCY)
Facility: CLINIC | Age: 42
Discharge: HOME | End: 2022-09-12
Attending: FAMILY MEDICINE | Admitting: FAMILY MEDICINE
Payer: COMMERCIAL

## 2022-09-12 VITALS
DIASTOLIC BLOOD PRESSURE: 63 MMHG | OXYGEN SATURATION: 100 % | WEIGHT: 196 LBS | SYSTOLIC BLOOD PRESSURE: 114 MMHG | HEART RATE: 72 BPM | RESPIRATION RATE: 16 BRPM | TEMPERATURE: 98.3 F

## 2022-09-12 DIAGNOSIS — R51.9 HEADACHE DISORDER: ICD-10-CM

## 2022-09-12 DIAGNOSIS — G47.00 INSOMNIA, UNSPECIFIED TYPE: ICD-10-CM

## 2022-09-12 LAB
ALBUMIN UR-MCNC: 10 MG/DL
APPEARANCE UR: ABNORMAL
BILIRUB UR QL STRIP: NEGATIVE
COLOR UR AUTO: ABNORMAL
GLUCOSE UR STRIP-MCNC: NEGATIVE MG/DL
HCG UR QL: NEGATIVE
HGB UR QL STRIP: NEGATIVE
HOLD SPECIMEN: NORMAL
KETONES UR STRIP-MCNC: NEGATIVE MG/DL
LEUKOCYTE ESTERASE UR QL STRIP: ABNORMAL
MUCOUS THREADS #/AREA URNS LPF: PRESENT /LPF
NITRATE UR QL: NEGATIVE
PH UR STRIP: 7 [PH] (ref 5–7)
RBC URINE: 0 /HPF
SP GR UR STRIP: 1.01 (ref 1–1.03)
SQUAMOUS EPITHELIAL: 27 /HPF
UROBILINOGEN UR STRIP-MCNC: NORMAL MG/DL
WBC URINE: 13 /HPF

## 2022-09-12 PROCEDURE — 81001 URINALYSIS AUTO W/SCOPE: CPT | Performed by: FAMILY MEDICINE

## 2022-09-12 PROCEDURE — 250N000011 HC RX IP 250 OP 636: Performed by: FAMILY MEDICINE

## 2022-09-12 PROCEDURE — 81025 URINE PREGNANCY TEST: CPT | Performed by: EMERGENCY MEDICINE

## 2022-09-12 PROCEDURE — 96372 THER/PROPH/DIAG INJ SC/IM: CPT | Performed by: FAMILY MEDICINE

## 2022-09-12 PROCEDURE — 81001 URINALYSIS AUTO W/SCOPE: CPT | Performed by: EMERGENCY MEDICINE

## 2022-09-12 PROCEDURE — 81025 URINE PREGNANCY TEST: CPT | Performed by: FAMILY MEDICINE

## 2022-09-12 PROCEDURE — 87086 URINE CULTURE/COLONY COUNT: CPT | Performed by: FAMILY MEDICINE

## 2022-09-12 PROCEDURE — 99284 EMERGENCY DEPT VISIT MOD MDM: CPT | Performed by: FAMILY MEDICINE

## 2022-09-12 RX ORDER — TOPIRAMATE 25 MG/1
100 TABLET, FILM COATED ORAL AT BEDTIME
COMMUNITY

## 2022-09-12 RX ORDER — KETOROLAC TROMETHAMINE 30 MG/ML
30 INJECTION, SOLUTION INTRAMUSCULAR; INTRAVENOUS ONCE
Status: COMPLETED | OUTPATIENT
Start: 2022-09-12 | End: 2022-09-12

## 2022-09-12 RX ORDER — IBUPROFEN 800 MG/1
800 TABLET, FILM COATED ORAL EVERY 8 HOURS PRN
Qty: 15 TABLET | Refills: 0 | Status: SHIPPED | OUTPATIENT
Start: 2022-09-12 | End: 2022-09-20

## 2022-09-12 RX ORDER — QUETIAPINE FUMARATE 25 MG/1
25 TABLET, FILM COATED ORAL AT BEDTIME
Qty: 30 TABLET | Refills: 0 | Status: SHIPPED | OUTPATIENT
Start: 2022-09-12

## 2022-09-12 RX ADMIN — KETOROLAC TROMETHAMINE 30 MG: 30 INJECTION, SOLUTION INTRAMUSCULAR; INTRAVENOUS at 21:04

## 2022-09-12 ASSESSMENT — ACTIVITIES OF DAILY LIVING (ADL)
ADLS_ACUITY_SCORE: 35
ADLS_ACUITY_SCORE: 35

## 2022-09-12 NOTE — ED NOTES
Assumed care of pt @ this time. Pt c/o R sided throbbing headache that started 4-5 days ago. Pt Denies N/V, denies CP/SOB. Lights dimmed for pt comfort, call light within reach. Will continue to monitor

## 2022-09-12 NOTE — ED TRIAGE NOTES
Severe HA, hasn't slept all night, and pain in the R ear and heaviness in the R side of her head. Pt requesting MRI today. Pt states she had an MRI and CT scan but unsure what the result was. HA x 2 years and worsened in the last couple of months.  Denies change in her vision.    Pt has been on Topiramate prescribed by neurology and taking daily.

## 2022-09-12 NOTE — ED PROVIDER NOTES
[facility] EMERGENCY DEPARTMENT ([facility])  9/12/22    History     Chief Complaint   Patient presents with     Headache     HPI  Nadia Egan is a 41 year old female who has a history of anemia, gastric reflux, vitamin D deficiency, and cervicalgia presents to the ED with concerns about her chronic headaches.  In review of the record patient has had multiple evaluations and been seen on several occasions at [facility] by both neurology and ophthalmology with evaluation of her chronic headaches.  Patient has had imaging that is demonstrated the possibility of increased intracranial pressure or intracranial hypertension.  The patient had been seen by both neurology and ophthalmology at [facility] and is currently in the process of being evaluated for this with the possibility of neurology performing a lumbar puncture to confirm or deny the possibility of increased intracranial hypertension.  Patient's headache has not changed appreciably she is mostly here because she states that she has not heard the results of her previous MRI from [facility] and wanted to discuss that with me she also notes some right ear pain and wanted to make sure that she did not have an ear infection.  Patient has no new neurologic symptoms numbness tingling images at this time headaches seem to be at baseline.    Past Medical History  History reviewed. No pertinent past medical history.  History reviewed. No pertinent surgical history.  ibuprofen (ADVIL/MOTRIN) 600 MG tablet  ibuprofen (ADVIL/MOTRIN) 800 MG tablet  QUEtiapine (SEROQUEL) 25 MG tablet  topiramate (TOPAMAX) 25 MG tablet      Allergies   Allergen Reactions     Chicken-Derived Products (Egg) Nausea and Vomiting     Nuts Itching     Family History  History reviewed. No pertinent family history.  Social History   Social History     Tobacco Use     Smoking status: Never Smoker     Smokeless tobacco: Never Used   Substance Use Topics     Alcohol use: No     Drug use: No       Past medical history, past surgical history, medications, allergies, family history, and social history were reviewed with the patient. No additional pertinent items.       Review of Systems  A complete review of systems was performed with pertinent positives and negatives noted in the HPI, and all other systems negative.    Physical Exam   BP: (!) 124/94  Pulse: 84  Temp: 98.5  F (36.9  C)  Resp: 16  Weight: 88.9 kg (196 lb)  SpO2: 100 %  Physical Exam  Constitutional:       General: She is not in acute distress.     Appearance: She is not diaphoretic.   HENT:      Head: Atraumatic.      Mouth/Throat:      Pharynx: No oropharyngeal exudate.   Eyes:      General: No scleral icterus.     Pupils: Pupils are equal, round, and reactive to light.   Cardiovascular:      Heart sounds: Normal heart sounds.   Pulmonary:      Effort: No respiratory distress.      Breath sounds: Normal breath sounds.   Abdominal:      General: Bowel sounds are normal.      Palpations: Abdomen is soft.      Tenderness: There is no abdominal tenderness.   Musculoskeletal:         General: No tenderness.   Skin:     General: Skin is warm.      Findings: No rash.   Neurological:      General: No focal deficit present.      Mental Status: She is oriented to person, place, and time.      Cranial Nerves: No cranial nerve deficit.      Sensory: No sensory deficit.      Motor: No weakness.      Coordination: Coordination normal.      Gait: Gait normal.      Deep Tendon Reflexes: Reflexes normal.         ED Course      Procedures       The medical record was reviewed and interpreted.              Results for orders placed or performed during the hospital encounter of 09/12/22   [place] Draw     Status: None    Narrative    The following orders were created for panel order [place] Draw.  Procedure                               Abnormality         Status                     ---------                               -----------         ------                      Extra Blue Top Tube[[id number]]                              Final result               Extra Red Top Tube[[id number]]                               Final result               Extra Green Top (Lithium...[[id number]]                      Final result               Extra Purple Top Tube[[id number]]                            Final result                 Please view results for these tests on the individual orders.   UA with Microscopic reflex to Culture     Status: Abnormal    Specimen: Urine, NOS   Result Value Ref Range    Color Urine Light Yellow Colorless, Straw, Light Yellow, Yellow    Appearance Urine Slightly Cloudy (A) Clear    Glucose Urine Negative Negative mg/dL    Bilirubin Urine Negative Negative    Ketones Urine Negative Negative mg/dL    Specific Gravity Urine 1.012 1.003 - 1.035    Blood Urine Negative Negative    pH Urine 7.0 5.0 - 7.0    Protein Albumin Urine 10  (A) Negative mg/dL    Urobilinogen Urine Normal Normal, 2.0 mg/dL    Nitrite Urine Negative Negative    Leukocyte Esterase Urine Large (A) Negative    Mucus Urine Present (A) None Seen /LPF    RBC Urine 0 <=2 /HPF    WBC Urine 13 (H) <=5 /HPF    Squamous Epithelials Urine 27 (H) <=1 /HPF    Narrative    Urine Culture ordered based on laboratory criteria   HCG qualitative urine     Status: Normal   Result Value Ref Range    hCG Urine Qualitative Negative Negative   Extra Blue Top Tube     Status: None   Result Value Ref Range    Hold Specimen JIC    Extra Red Top Tube     Status: None   Result Value Ref Range    Hold Specimen JIC    Extra Green Top (Lithium Heparin) Tube     Status: None   Result Value Ref Range    Hold Specimen JIC    Extra Purple Top Tube     Status: None   Result Value Ref Range    Hold Specimen JIC    Urine Culture     Status: None (Preliminary result)    Specimen: Urine, NOS   Result Value Ref Range    Culture Culture in progress      Medications   ketorolac (TORADOL) injection 30 mg (30 mg Intramuscular Given 9/12/22  2104)        Neurology from [facility] was contacted and I discussed the case with staff they will make arrangements for patient to be seen earlier.       Assessments & Plan (with Medical Decision Making)       I have reviewed the nursing notes. I have reviewed the findings, diagnosis, plan and need for follow up with the patient.    Discharge Medication List as of 9/12/2022  9:44 PM      START taking these medications    Details   !! ibuprofen (ADVIL/MOTRIN) 800 MG tablet Take 1 tablet (800 mg) by mouth every 8 hours as needed for inflammatory pain, Disp-15 tablet, R-0, Local Print      QUEtiapine (SEROQUEL) 25 MG tablet Take 1 tablet (25 mg) by mouth At Bedtime, Disp-30 tablet, R-0, Local Print       !! - Potential duplicate medications found. Please discuss with provider.        Patient with headaches some insomnia issues with previous diagnosis of possible intracranial hypertension patient will be following up with neurology and ophthalmology at [facility] I will start her on Seroquel to help with her sleep issues she will continue with her current medications and to also use Advil if needed for pain.  Patient will return if she develops any neurologic symptoms or change in vision.  Final diagnoses:   Headache disorder   Insomnia, unspecified type       --    [facility] EMERGENCY DEPARTMENT  9/12/2022     Du Aguero MD  09/13/22 1440

## 2022-09-13 NOTE — DISCHARGE INSTRUCTIONS
Patient discussed with [facility] neurology they will see her as soon as possible for any further evaluation patient will start Seroquel 25 mg at bedtime to help with sleep.

## 2022-09-13 NOTE — RESULT ENCOUNTER NOTE
[facility] Emergency Dept discharge antibiotic (if prescribed): None  No changes in treatment per [facility] ED Lab Result Urine culture protocol.

## 2022-09-14 LAB — BACTERIA UR CULT: NORMAL

## 2022-09-14 NOTE — RESULT ENCOUNTER NOTE
Final urine culture report is negative.  Adult Negative Urine culture parameters per protocol: Any # Urogenital single or mixed organism, <10,000 col/ml single organism (cath/midstream), and > 3 organisms (No susceptibilities performed).  [facility] Emergency Dept discharge antibiotic prescribed (If applicable): None  Treatment recommendations per [facility] ED Lab Result Urine Culture protocol.

## 2022-09-29 ENCOUNTER — OFFICE VISIT (OUTPATIENT)
Dept: NEUROLOGY | Facility: CLINIC | Age: 42
End: 2022-09-29
Attending: EMERGENCY MEDICINE
Payer: COMMERCIAL

## 2022-09-29 VITALS
SYSTOLIC BLOOD PRESSURE: 111 MMHG | WEIGHT: 192 LBS | BODY MASS INDEX: 27.49 KG/M2 | DIASTOLIC BLOOD PRESSURE: 63 MMHG | HEART RATE: 63 BPM | HEIGHT: 70 IN

## 2022-09-29 DIAGNOSIS — G93.2 IIH (IDIOPATHIC INTRACRANIAL HYPERTENSION): Primary | ICD-10-CM

## 2022-09-29 DIAGNOSIS — F51.5 NIGHTMARES: ICD-10-CM

## 2022-09-29 DIAGNOSIS — G43.001 MIGRAINE WITHOUT AURA AND WITH STATUS MIGRAINOSUS, NOT INTRACTABLE: ICD-10-CM

## 2022-09-29 DIAGNOSIS — G47.00 INSOMNIA, UNSPECIFIED TYPE: ICD-10-CM

## 2022-09-29 PROCEDURE — 99245 OFF/OP CONSLTJ NEW/EST HI 55: CPT | Performed by: PSYCHIATRY & NEUROLOGY

## 2022-09-29 RX ORDER — PRAZOSIN HYDROCHLORIDE 1 MG/1
1 CAPSULE ORAL AT BEDTIME
Qty: 90 CAPSULE | Refills: 0 | Status: SHIPPED | OUTPATIENT
Start: 2022-09-29

## 2022-09-29 NOTE — NURSING NOTE
Chief Complaint   Patient presents with     Headache     Unable to sleep at night which is causing headaches- Prefers paper rx for any prescriptions      Veronica King CMA on 9/29/2022 at 12:09 PM

## 2022-09-29 NOTE — PROGRESS NOTES
NEUROLOGY OUTPATIENT CONSULT NOTE   Sep 29, 2022     CHIEF COMPLAINT/REASON FOR VISIT/REASON FOR CONSULT  Patient presents with:  Headache: Unable to sleep at night which is causing headaches- Prefers paper rx for any prescriptions     REASON FOR CONSULTATION-sleep issues and headaches    REFERRAL SOURCE  Dr. Raghu Dupont   Dr. Raghu Dupont    HISTORY OF PRESENT ILLNESS  Nadia Egan is a 41 year old female seen today for evaluation of sleep issues and headaches    1.  She reports that over the last 2 years she has been having sleep issues.  She is unable to fall asleep as she gets nightmares and then wakes up and cannot go back to sleep.  The content of the nightmares is more regarding what happened during the daytime.  She denies any history of mental illness or any depression or any PTSD.  Has not started any new medications.  Denies any other provoking factors.  Has not tried any medications for this.    2.  She further reports that she when she lies down and gets pounding in her head.  There is also a noise in her head when she is asleep.  There are some tightness in her head with this as well.  This can happen when she is awake also.    3.  She further reports pressure in her head all over.  There is no significant photophobia or phonophobia.  Does get blurred vision and some flashing lights in her vision during the daytime.  She was put on Topamax by neurologist at [facility].  She had told the other neurologist that it was helping though tells me that it has not made a difference.  Headaches are there all the time.    Previous history is reviewed and this is unchanged.    PAST MEDICAL/SURGICAL HISTORY  History reviewed. No pertinent past medical history.  Patient Active Problem List   Diagnosis     Cervicalgia     Pain in joint, shoulder region   Reviewed and otherwise noncontributory.    FAMILY HISTORY  History reviewed. No pertinent family history.   Negative for neurological  "problems.    SOCIAL HISTORY  Social History     Tobacco Use     Smoking status: Never Smoker     Smokeless tobacco: Never Used   Substance Use Topics     Alcohol use: No     Drug use: No       SYSTEMS REVIEW  Twelve-system ROS was done and other than the HPI this was negative.  Pertinent positives noted in the HPI.    MEDICATIONS  topiramate (TOPAMAX) 25 MG tablet, Take 100 mg by mouth At Bedtime  ibuprofen (ADVIL/MOTRIN) 600 MG tablet, Take 1 tablet (600 mg) by mouth every 6 hours as needed for pain (Patient not taking: Reported on 9/29/2022)  QUEtiapine (SEROQUEL) 25 MG tablet, Take 1 tablet (25 mg) by mouth At Bedtime (Patient not taking: Reported on 9/29/2022)    No current facility-administered medications on file prior to visit.       PHYSICAL EXAMINATION  VITALS: /63 (BP Location: Left arm, Patient Position: Sitting)   Pulse 63   Ht 1.778 m (5' 10\")   Wt 87.1 kg (192 lb)   BMI 27.55 kg/m    GENERAL: Healthy appearing, alert, no acute distress, normal habitus.  CARDIOVASCULAR: Extremities warm and well perfused. Pulses present.   EYES: Funduscopic exam limited.  NEUROLOGICAL:  Patient is awake and oriented to self, place and time.  Attention span is normal.  Memory is grossly intact.  Language is fluent and follows commands appropriately.  Appropriate fund of knowledge. Cranial nerves 2-12 are intact. There is no pronator drift.  Motor exam shows 5/5 strength in all extremities.  Tone is symmetric bilaterally in upper and lower extremities.  Reflexes are symmetric and 2+ in upper extremities and lower extremities. Sensory exam is grossly intact to light touch, pin prick and vibration.  Finger to nose and heel to shin is without dysmetria.  Romberg is negative.  Gait is normal and the patient is able to do tandem walk and walk on toes and heels.      DIAGNOSTICS  CT head [place] 5/21/22:   1. No acute intracranial process.   2. Presence of mild tortuosity of the optic nerve sheaths and partial " "emptysella, raise the possibility of idiopathic intracranial hypertension.    Brain MRI 6/1/2022:   1. No infarction, mass or hemorrhage.   2. Partially of the sella turcica and mildly tortuous optic nerve sheaths are nonspecific findings, which can be associated with idiopathic intracranial hypertension.     CTV Head 7/12/22: report reviewed today. no acute intracranial findings. No flattening of the globe, no empty sella sign, and no stensosis of tranverse cerebral veins.     MRI C Spine  Impression:     1. No significant spinal canal or foraminal stenosis in the cervical spine.   2. No abnormal signal within the cervical spinal cord.       Saw Dr. Downey in neuro-ophthalmology 8/2022:   \"Headache, presumed IIH based on MRI, headache and pulsatile tinnitus symptoms, and recent weight gain.   + weight gain 35 lbs ~ 1 year ago.  Endorses pulsatile tinnitus, headaches, and neck pain, but pretty pan positive ROS. No TVO's. No minocycline, doxycycline, OCP.   -Again, no evidence of papilledema on exam today.   HVF 24-2 (8/2/22): both eyes unreliable. Possible optic nerve enlargement left eye. Nonspecific defects in right eye, optic nerve blind spot appears normal  - OCT RNFL baseline 7/12/22 - normal right eye, mild superior thickening left eye   - CTV 7/12/22 no evidence of thrombus\"    LABS  Component      Latest Ref Rng & Units 5/21/2022   % Neutrophils      % 60   % Lymphocytes      % 30   % Monocytes      % 7   % Eosinophils      % 3   % Basophils      % 0   Absolute Neutrophil      1.6 - 8.3 10e3/uL 4.1   Absolute Lymphocytes      0.8 - 5.3 10e3/uL 2.1   Absolute Monocytes      0.0 - 1.3 10e3/uL 0.5   Absolute Eosinophils      0.0 - 0.7 10e3/uL 0.2   Absolute Basophils      0.0 - 0.2 10e3/uL 0.0   RBC Morphology       Confirmed RBC Indices   Platelet Morphology      Automated Count Confirmed. Platelet morphology is normal. Automated Count Confirmed. Platelet morphology is normal.   Sodium      133 - 144 mmol/L 140 "   Potassium      3.4 - 5.3 mmol/L 3.5   Chloride      94 - 109 mmol/L 108   Carbon Dioxide      20 - 32 mmol/L 25   Anion Gap      3 - 14 mmol/L 7   Urea Nitrogen      7 - 30 mg/dL 6 (L)   Creatinine      0.52 - 1.04 mg/dL 0.39 (L)   Calcium      8.5 - 10.1 mg/dL 9.1   Glucose      70 - 99 mg/dL 112 (H)   GFR Estimate      >60 mL/min/1.73m2 >90   WBC      4.0 - 11.0 10e3/uL 6.9   RBC Count      3.80 - 5.20 10e6/uL 4.80   Hemoglobin      11.7 - 15.7 g/dL 11.6 (L)   Hematocrit      35.0 - 47.0 % 37.5   MCV      78 - 100 fL 78   MCH      26.5 - 33.0 pg 24.2 (L)   MCHC      31.5 - 36.5 g/dL 30.9 (L)   RDW      10.0 - 15.0 % 14.8   Platelet Count      150 - 450 10e3/uL 296       OUTSIDE RECORDS  Outside referral notes and chart notes were reviewed and pertinent information has been summarized (in addition to the HPI):-    IMPRESSION/REPORT/PLAN  Rule out IIH (idiopathic intracranial hypertension)  Rule out migraine without aura and with status migrainosus, not intractable  Nightmares  Insomnia, unspecified type    This is a 41 year old female with insomnia and nightmares and some pounding headaches with lying down and some daytime headaches with flashing lights in her vision.  On previous exam through neuro-ophthalmology she did not have papilledema.  MRI brain has shown an empty sella.    Possibly her pounding headaches with lying down could be suggestive of idiopathic intracranial hypertension.  Lumbar puncture would be reasonable to further evaluate.  We will check blood work to look for other causes of her symptoms.  She has tried Topamax which has not been effective.  Could consider switching her to Diamox depending on the results of the lumbar puncture.    Daytime headaches with flashing lights could be suggestive of migraine with aura.  Could consider further treatment depending on the results of the lumbar puncture.  For right now she can continue the Topamax and continue over-the-counter medications for abortive  therapy.    With her insomnia and nightmares we will restart her on prazosin.  If she sleeps better possibly her migraine headaches might get better as well.  Could consider sleep study if the medication is not effective.    I can see her back in 8 to 10 weeks.  Should keep a log of her symptoms.    -     XR Lumbar Puncture Spinal Tap Diagnostic; Future  -     CSF Cell Count with Differential:; Future  -     Protein total CSF:; Future  -     Glucose CSF:; Future  -     prazosin (MINIPRESS) 1 MG capsule; Take 1 capsule (1 mg) by mouth At Bedtime    Return in about 10 weeks (around 12/8/2022) for In-Clinic Visit (must), After testing.    Over 60 minutes were spent coordinating the care for the patient on the day of the encounter.  This includes previsit, during visit and post visit activities as documented above.  Counseling patient.  Reviewing chart.  Reviewing outside testing.  Extra time due to use of .  (Activities include but not inclusive of reviewing chart, reviewing outside records, reviewing labs and imaging study results as well as the images, patient visit time including getting history and exam,  use if applicable, review of test results with the patient and coming up with a plan in a shared model, counseling patient and family, education and answering patient questions, EMR , EMR diagnosis entry and problem list management, medication reconciliation and prescription management if applicable, paperwork if applicable, printing documents and documentation of the visit activities.)  Billing:-4 data points, 4 problem points    Heriberto Galindo MD  Neurologist  [facility]  Tel:- [phone]    This note was dictated using voice recognition software.  Any grammatical or context distortions are unintentional and inherent to the software.

## 2022-09-29 NOTE — LETTER
9/29/2022         RE: Nadia Egan  [address]  [place] [address]        Dear Colleague,    Thank you for referring your patient, Nadia Egan, to the [facility] [place]. Please see a copy of my visit note below.    NEUROLOGY OUTPATIENT CONSULT NOTE   Sep 29, 2022     CHIEF COMPLAINT/REASON FOR VISIT/REASON FOR CONSULT  Patient presents with:  Headache: Unable to sleep at night which is causing headaches- Prefers paper rx for any prescriptions     REASON FOR CONSULTATION-sleep issues and headaches    REFERRAL SOURCE  Dr. Raghu Dupont  CC Dr. Raghu Dupont    HISTORY OF PRESENT ILLNESS  Nadia Egan is a 41 year old female seen today for evaluation of sleep issues and headaches    1.  She reports that over the last 2 years she has been having sleep issues.  She is unable to fall asleep as she gets nightmares and then wakes up and cannot go back to sleep.  The content of the nightmares is more regarding what happened during the daytime.  She denies any history of mental illness or any depression or any PTSD.  Has not started any new medications.  Denies any other provoking factors.  Has not tried any medications for this.    2.  She further reports that she when she lies down and gets pounding in her head.  There is also a noise in her head when she is asleep.  There are some tightness in her head with this as well.  This can happen when she is awake also.    3.  She further reports pressure in her head all over.  There is no significant photophobia or phonophobia.  Does get blurred vision and some flashing lights in her vision during the daytime.  She was put on Topamax by neurologist at [facility].  She had told the other neurologist that it was helping though tells me that it has not made a difference.  Headaches are there all the time.    Previous history is reviewed and this is unchanged.    PAST MEDICAL/SURGICAL HISTORY  History reviewed. No  "pertinent past medical history.  Patient Active Problem List   Diagnosis     Cervicalgia     Pain in joint, shoulder region   Reviewed and otherwise noncontributory.    FAMILY HISTORY  History reviewed. No pertinent family history.   Negative for neurological problems.    SOCIAL HISTORY  Social History     Tobacco Use     Smoking status: Never Smoker     Smokeless tobacco: Never Used   Substance Use Topics     Alcohol use: No     Drug use: No       SYSTEMS REVIEW  Twelve-system ROS was done and other than the HPI this was negative.  Pertinent positives noted in the HPI.    MEDICATIONS  topiramate (TOPAMAX) 25 MG tablet, Take 100 mg by mouth At Bedtime  ibuprofen (ADVIL/MOTRIN) 600 MG tablet, Take 1 tablet (600 mg) by mouth every 6 hours as needed for pain (Patient not taking: Reported on 9/29/2022)  QUEtiapine (SEROQUEL) 25 MG tablet, Take 1 tablet (25 mg) by mouth At Bedtime (Patient not taking: Reported on 9/29/2022)    No current facility-administered medications on file prior to visit.       PHYSICAL EXAMINATION  VITALS: /63 (BP Location: Left arm, Patient Position: Sitting)   Pulse 63   Ht 1.778 m (5' 10\")   Wt 87.1 kg (192 lb)   BMI 27.55 kg/m    GENERAL: Healthy appearing, alert, no acute distress, normal habitus.  CARDIOVASCULAR: Extremities warm and well perfused. Pulses present.   EYES: Funduscopic exam limited.  NEUROLOGICAL:  Patient is awake and oriented to self, place and time.  Attention span is normal.  Memory is grossly intact.  Language is fluent and follows commands appropriately.  Appropriate fund of knowledge. Cranial nerves 2-12 are intact. There is no pronator drift.  Motor exam shows 5/5 strength in all extremities.  Tone is symmetric bilaterally in upper and lower extremities.  Reflexes are symmetric and 2+ in upper extremities and lower extremities. Sensory exam is grossly intact to light touch, pin prick and vibration.  Finger to nose and heel to shin is without dysmetria.  " "Romberg is negative.  Gait is normal and the patient is able to do tandem walk and walk on toes and heels.      DIAGNOSTICS  CT head [place] 5/21/22:   1. No acute intracranial process.   2. Presence of mild tortuosity of the optic nerve sheaths and partial emptysella, raise the possibility of idiopathic intracranial hypertension.    Brain MRI 6/1/2022:   1. No infarction, mass or hemorrhage.   2. Partially of the sella turcica and mildly tortuous optic nerve sheaths are nonspecific findings, which can be associated with idiopathic intracranial hypertension.     CTV Head 7/12/22: report reviewed today. no acute intracranial findings. No flattening of the globe, no empty sella sign, and no stensosis of tranverse cerebral veins.     MRI C Spine  Impression:     1. No significant spinal canal or foraminal stenosis in the cervical spine.   2. No abnormal signal within the cervical spinal cord.       Saw Dr. Downey in neuro-ophthalmology 8/2022:   \"Headache, presumed IIH based on MRI, headache and pulsatile tinnitus symptoms, and recent weight gain.   + weight gain 35 lbs ~ 1 year ago.  Endorses pulsatile tinnitus, headaches, and neck pain, but pretty pan positive ROS. No TVO's. No minocycline, doxycycline, OCP.   -Again, no evidence of papilledema on exam today.   HVF 24-2 (8/2/22): both eyes unreliable. Possible optic nerve enlargement left eye. Nonspecific defects in right eye, optic nerve blind spot appears normal  - OCT RNFL baseline 7/12/22 - normal right eye, mild superior thickening left eye   - CTV 7/12/22 no evidence of thrombus\"    LABS  Component      Latest Ref Rng & Units 5/21/2022   % Neutrophils      % 60   % Lymphocytes      % 30   % Monocytes      % 7   % Eosinophils      % 3   % Basophils      % 0   Absolute Neutrophil      1.6 - 8.3 10e3/uL 4.1   Absolute Lymphocytes      0.8 - 5.3 10e3/uL 2.1   Absolute Monocytes      0.0 - 1.3 10e3/uL 0.5   Absolute Eosinophils      0.0 - 0.7 10e3/uL 0.2   Absolute " Basophils      0.0 - 0.2 10e3/uL 0.0   RBC Morphology       Confirmed RBC Indices   Platelet Morphology      Automated Count Confirmed. Platelet morphology is normal. Automated Count Confirmed. Platelet morphology is normal.   Sodium      133 - 144 mmol/L 140   Potassium      3.4 - 5.3 mmol/L 3.5   Chloride      94 - 109 mmol/L 108   Carbon Dioxide      20 - 32 mmol/L 25   Anion Gap      3 - 14 mmol/L 7   Urea Nitrogen      7 - 30 mg/dL 6 (L)   Creatinine      0.52 - 1.04 mg/dL 0.39 (L)   Calcium      8.5 - 10.1 mg/dL 9.1   Glucose      70 - 99 mg/dL 112 (H)   GFR Estimate      >60 mL/min/1.73m2 >90   WBC      4.0 - 11.0 10e3/uL 6.9   RBC Count      3.80 - 5.20 10e6/uL 4.80   Hemoglobin      11.7 - 15.7 g/dL 11.6 (L)   Hematocrit      35.0 - 47.0 % 37.5   MCV      78 - 100 fL 78   MCH      26.5 - 33.0 pg 24.2 (L)   MCHC      31.5 - 36.5 g/dL 30.9 (L)   RDW      10.0 - 15.0 % 14.8   Platelet Count      150 - 450 10e3/uL 296       OUTSIDE RECORDS  Outside referral notes and chart notes were reviewed and pertinent information has been summarized (in addition to the HPI):-    IMPRESSION/REPORT/PLAN  Rule out IIH (idiopathic intracranial hypertension)  Rule out migraine without aura and with status migrainosus, not intractable  Nightmares  Insomnia, unspecified type    This is a 41 year old female with insomnia and nightmares and some pounding headaches with lying down and some daytime headaches with flashing lights in her vision.  On previous exam through neuro-ophthalmology she did not have papilledema.  MRI brain has shown an empty sella.    Possibly her pounding headaches with lying down could be suggestive of idiopathic intracranial hypertension.  Lumbar puncture would be reasonable to further evaluate.  We will check blood work to look for other causes of her symptoms.  She has tried Topamax which has not been effective.  Could consider switching her to Diamox depending on the results of the lumbar  puncture.    Daytime headaches with flashing lights could be suggestive of migraine with aura.  Could consider further treatment depending on the results of the lumbar puncture.  For right now she can continue the Topamax and continue over-the-counter medications for abortive therapy.    With her insomnia and nightmares we will restart her on prazosin.  If she sleeps better possibly her migraine headaches might get better as well.  Could consider sleep study if the medication is not effective.    I can see her back in 8 to 10 weeks.  Should keep a log of her symptoms.    -     XR Lumbar Puncture Spinal Tap Diagnostic; Future  -     CSF Cell Count with Differential:; Future  -     Protein total CSF:; Future  -     Glucose CSF:; Future  -     prazosin (MINIPRESS) 1 MG capsule; Take 1 capsule (1 mg) by mouth At Bedtime    Return in about 10 weeks (around 12/8/2022) for In-Clinic Visit (must), After testing.    Over 60 minutes were spent coordinating the care for the patient on the day of the encounter.  This includes previsit, during visit and post visit activities as documented above.  Counseling patient.  Reviewing chart.  Reviewing outside testing.  Extra time due to use of .  (Activities include but not inclusive of reviewing chart, reviewing outside records, reviewing labs and imaging study results as well as the images, patient visit time including getting history and exam,  use if applicable, review of test results with the patient and coming up with a plan in a shared model, counseling patient and family, education and answering patient questions, EMR , EMR diagnosis entry and problem list management, medication reconciliation and prescription management if applicable, paperwork if applicable, printing documents and documentation of the visit activities.)  Billing:-4 data points, 4 problem points    Heriberto Galindo MD  Neurologist  [facility]  [place]  Tel:- [phone]    This note was dictated using voice recognition software.  Any grammatical or context distortions are unintentional and inherent to the software.        Again, thank you for allowing me to participate in the care of your patient.        Sincerely,        Heriberto Galindo MD

## 2022-10-12 ENCOUNTER — TELEPHONE (OUTPATIENT)
Dept: NEUROLOGY | Facility: CLINIC | Age: 42
End: 2022-10-12

## 2022-10-12 NOTE — TELEPHONE ENCOUNTER
Health Call Center    Phone Message    May a detailed message be left on voicemail: yes     Reason for Call: Medication Question or concern regarding medication   Prescription Clarification  Name of Medication: prazosin (MINIPRESS) 1 MG capsule  Prescribing Provider: Heriberto Galindo MD   Pharmacy: [place], [place] - [address] [facility]   What on the order needs clarification? Pt is calling because she says this medication is not helping her sleep. Pt says it does not put her to sleep. She has even upped the dosage to 2 pills and still she cannot sleep. Pt says she hasn't really slept in 2 days. Pt would like a call back to discuss.      Action Taken: Message routed to:  Other: [facility]    Travel Screening: Not Applicable

## 2022-10-13 NOTE — TELEPHONE ENCOUNTER
Okay to take 2 tablets of the prazosin.  Would recommend her to complete the testing and then meet back to see what the neck steps will be.

## 2022-10-13 NOTE — TELEPHONE ENCOUNTER
Returned call to patient using  ID#[id number] Lia    Pt reports that she is not getting much sleep, has not slept at all the last 2 nights. Prior to this she was only sleeping 1-2 hours per night.     Patient is currently taking 2 tablets of prazosin every night, MD prescribed it 1 tablet at bedtime- is it ok that she is taking 2?     Goes to bed at 9pm, takes prazosin at 8pm. Pt reports that it has been weeks since she has last slept more then 1 hour/night. Does not take naps during the day. Feels tired and fatigued during day. Has tried melatonin 10mg at bedtime also, this also did not help her sleep.     Please advise.     Frantz Webster, RN, BSN  [facility]

## 2022-10-14 NOTE — TELEPHONE ENCOUNTER
Mayte- can you call this pt back with an .     She needs to continue prazosin 1-2 tablets every night for sleep and complete the LP. She called yesterday because her sleep has not improved since he started her on prazosin. He is aware of this. Nothing further implemented for sleep at this time.    Dr. Galindo says below that she should complete testing and f/u.     Can you assist with getting her to scheduling for the LP?     Frantz Webster RN, BSN  [facility] Neurology

## 2022-10-14 NOTE — TELEPHONE ENCOUNTER
Dr Galindo is not in clinic today. I spoke with our nurse Frantz and per her, she will contact the pt.       BLU Park on 10/14/2022 at 11:34 AM

## 2022-10-14 NOTE — TELEPHONE ENCOUNTER
Used the language line.  ID # [id number].    Called and relayed Dr. Galindo's message to the pt.     Per pt said that she stopped taking the Prazosin since last night because it has not been helping her with her sleep.     She has been taking 2 tablets of the Prazosin for a while now and it is not working. She wants to know what she can do and take to help her with her sleep. Please advise. Thanks.      BLU Park on 10/14/2022 at 11:12 AM

## 2022-10-17 NOTE — TELEPHONE ENCOUNTER
Called patient with .   Advised per Dr. Galindo to continue the medication and to schedule the LP. She verbalized understanding. I provided the number and asked the  to help scheduled the patient for the LP, she confirmed that she would do this.   Advised patient that we could schedule a follow up sooner after the LP if needed.

## 2022-10-21 ENCOUNTER — TELEPHONE (OUTPATIENT)
Dept: GENERAL RADIOLOGY | Facility: CLINIC | Age: 42
End: 2022-10-21

## 2022-10-21 DIAGNOSIS — Z01.812 PRE-PROCEDURE LAB EXAM: Primary | ICD-10-CM

## 2022-10-21 NOTE — TELEPHONE ENCOUNTER
INTERVENTIONAL RADIOLOGY INSTRUCTIONS       Voicemail left for patient via [employer] interpretor ([id number]) with the following instructions  You are scheduled for lumbar puncture in the Imaging Department [facility]      Date: 10/24/22     Check into the Lab and Imaging Department at: 1230    Address: [facility], [address] [place] [id number]       You may eat and drink without restriction.    You will need to lay flat for 1 hour after the procedure.    If you are taking a blood thinner, please notify us order your primary provider immediately for instructions.    Please take all of your other medications as prescribed that day.      One visitor may accompany you to your procedure.    You must have transportation home, you can't drive yourself.      COVID TEST INSTRUCTIONS    Before you come in you must get tested for COVID-19, even if you've been vaccinated.       1 to 2 DAYS BEFORE YOUR PROCEDURE, TAKE AN AT-HOME COVID ANTIGEN TEST. You can buy these at many pharmacy stores. Or you can order free, at-home tests at covid.gov/tests.       If you can't find an at-home, rapid antigen test, please schedule a PCR test with [facility] by calling [phone], or visiting [email or web address]. We can't accept tests that are more than 4 days old.       If your test is negative, PLEASE TAKE A PHOTO . Show the photo to the nurse when you come in for your procedure.       If your test is positive, please tell your provider's office right away. A positive test means that you have COVID-19. (CALL [phone]) We'll probably have to postpone your procedure. Your care team will discuss this with you. After that, we'll let you know what to do and when you can re-schedule.

## 2022-11-07 ENCOUNTER — HOSPITAL ENCOUNTER (EMERGENCY)
Facility: CLINIC | Age: 42
Discharge: HOME | End: 2022-11-07
Attending: EMERGENCY MEDICINE | Admitting: EMERGENCY MEDICINE
Payer: COMMERCIAL

## 2022-11-07 VITALS
DIASTOLIC BLOOD PRESSURE: 71 MMHG | BODY MASS INDEX: 27 KG/M2 | WEIGHT: 188.2 LBS | OXYGEN SATURATION: 100 % | SYSTOLIC BLOOD PRESSURE: 108 MMHG | HEART RATE: 75 BPM | TEMPERATURE: 98.3 F | RESPIRATION RATE: 16 BRPM

## 2022-11-07 DIAGNOSIS — G43.009 MIGRAINE WITHOUT AURA AND WITHOUT STATUS MIGRAINOSUS, NOT INTRACTABLE: ICD-10-CM

## 2022-11-07 PROCEDURE — 99284 EMERGENCY DEPT VISIT MOD MDM: CPT | Mod: 25 | Performed by: EMERGENCY MEDICINE

## 2022-11-07 PROCEDURE — 76512 OPH US DX B-SCAN: CPT | Mod: 26 | Performed by: EMERGENCY MEDICINE

## 2022-11-07 PROCEDURE — 250N000011 HC RX IP 250 OP 636: Performed by: EMERGENCY MEDICINE

## 2022-11-07 PROCEDURE — 76512 OPH US DX B-SCAN: CPT | Mod: 50 | Performed by: EMERGENCY MEDICINE

## 2022-11-07 PROCEDURE — 258N000003 HC RX IP 258 OP 636: Performed by: EMERGENCY MEDICINE

## 2022-11-07 RX ORDER — TRAZODONE HYDROCHLORIDE 50 MG/1
50 TABLET, FILM COATED ORAL AT BEDTIME
COMMUNITY

## 2022-11-07 RX ORDER — PROMETHAZINE HYDROCHLORIDE 25 MG/1
25 TABLET ORAL
COMMUNITY
End: 2022-11-07

## 2022-11-07 RX ORDER — KETOROLAC TROMETHAMINE 10 MG/1
10 TABLET, FILM COATED ORAL EVERY 6 HOURS PRN
Qty: 20 TABLET | Refills: 0 | Status: SHIPPED | OUTPATIENT
Start: 2022-11-07

## 2022-11-07 RX ORDER — DIPHENHYDRAMINE HCL 25 MG
25 TABLET ORAL EVERY 6 HOURS PRN
Qty: 30 TABLET | Refills: 0 | Status: SHIPPED | OUTPATIENT
Start: 2022-11-07

## 2022-11-07 RX ORDER — DIPHENHYDRAMINE HYDROCHLORIDE 50 MG/ML
25 INJECTION INTRAMUSCULAR; INTRAVENOUS ONCE
Status: COMPLETED | OUTPATIENT
Start: 2022-11-07 | End: 2022-11-07

## 2022-11-07 RX ORDER — METOCLOPRAMIDE 5 MG/1
5 TABLET ORAL
Qty: 20 TABLET | Refills: 0 | Status: SHIPPED | OUTPATIENT
Start: 2022-11-07

## 2022-11-07 RX ORDER — METOCLOPRAMIDE HYDROCHLORIDE 5 MG/ML
5 INJECTION INTRAMUSCULAR; INTRAVENOUS ONCE
Status: COMPLETED | OUTPATIENT
Start: 2022-11-07 | End: 2022-11-07

## 2022-11-07 RX ORDER — KETOROLAC TROMETHAMINE 15 MG/ML
15 INJECTION, SOLUTION INTRAMUSCULAR; INTRAVENOUS ONCE
Status: COMPLETED | OUTPATIENT
Start: 2022-11-07 | End: 2022-11-07

## 2022-11-07 RX ADMIN — SODIUM CHLORIDE 1000 ML: 9 INJECTION, SOLUTION INTRAVENOUS at 06:23

## 2022-11-07 RX ADMIN — KETOROLAC TROMETHAMINE 15 MG: 15 INJECTION, SOLUTION INTRAMUSCULAR; INTRAVENOUS at 06:26

## 2022-11-07 RX ADMIN — DIPHENHYDRAMINE HYDROCHLORIDE 25 MG: 50 INJECTION, SOLUTION INTRAMUSCULAR; INTRAVENOUS at 06:25

## 2022-11-07 RX ADMIN — METOCLOPRAMIDE HYDROCHLORIDE 5 MG: 5 INJECTION INTRAMUSCULAR; INTRAVENOUS at 06:23

## 2022-11-07 ASSESSMENT — ACTIVITIES OF DAILY LIVING (ADL): ADLS_ACUITY_SCORE: 35

## 2022-11-07 NOTE — ED TRIAGE NOTES
Triage Assessment     Row Name 11/07/22 0536       Triage Assessment (Adult)    Airway WDL WDL       Respiratory WDL    Respiratory WDL WDL

## 2022-11-07 NOTE — ED PROVIDER NOTES
ED Provider Note  [facility]      History     Chief Complaint   Patient presents with     Headache     Pain behind eyes for the past 2 days. Intermittent nausea. Denies vomiting. Was seen at [facility] in May.     HPI  Nadia Egan is a 42 year old [place]ymra[place] speaking female with past medical history of migraines, status migrainosus, suspected idiopathic intracranial hypertension who presents to the ED with complaint of headache.  States her symptoms started 3 days ago.  Is located behind both of her eyes.  Radiates into the top of her head.  No particular aggravating or alleviating factors.  Has been constant since onset.  She denies associated symptoms.  Specifically, denies motor weakness, sensory deficit, visual field deficit, neck pain, fevers, and has no other medical complaints.    Of note, patient went to outside ED for similar symptoms on 10/31/2022.  At this time, she states that the pain was primarily on the top of her head.  She was seen by the neurology team there and was ultimately discharged with Benadryl, Depakote, Medrol Dosepak, and Phenergan.  She was instructed to follow-up with her neurology group.  She states the medications were initially helping but are now resistant.    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  She underwent a fluoroscopic lumbar puncture on 10/28/2022 which yielded an equivocal opening pressure of 22 and had a drainage of 8 cc.  Noncon CT head on 11/1/2022 unremarkable.  CT venogram on 7/13/22 negative for thrombus.    Past Medical History  No past medical history on file.  No past surgical history on file.  diphenhydrAMINE (BENADRYL) 25 MG tablet  ketorolac (TORADOL) 10 MG tablet  metoclopramide (REGLAN) 5 MG tablet  traZODone (DESYREL) 50 MG tablet  prazosin (MINIPRESS) 1 MG capsule  QUEtiapine (SEROQUEL) 25 MG tablet  topiramate (TOPAMAX) 25 MG tablet      Allergies   Allergen  Reactions     Chicken-Derived Products (Egg) Nausea and Vomiting     Nuts Itching     Family History  No family history on file.  Social History   Social History     Tobacco Use     Smoking status: Never     Smokeless tobacco: Never   Substance Use Topics     Alcohol use: No     Drug use: No      Past medical history, past surgical history, medications, allergies, family history, and social history were reviewed with the patient. No additional pertinent items.       Review of Systems   Constitutional: Negative for chills, fatigue and fever.   HENT: Negative for congestion and sore throat.    Eyes: Negative for pain and visual disturbance.   Respiratory: Negative for cough, chest tightness and shortness of breath.    Cardiovascular: Negative for chest pain and palpitations.   Gastrointestinal: Negative for abdominal distention, abdominal pain, constipation, diarrhea, nausea and vomiting.   Genitourinary: Negative for difficulty urinating, dysuria, frequency and urgency.   Musculoskeletal: Negative for arthralgias, back pain, myalgias and neck pain.   Skin: Negative for color change and rash.   Neurological: Positive for headaches. Negative for dizziness and weakness.   Psychiatric/Behavioral: Negative for confusion.     A complete review of systems was performed with pertinent positives and negatives noted in the HPI, and all other systems negative.    Physical Exam   BP: 130/80  Pulse: 77  Temp: 98.2  F (36.8  C)  Resp: 16  Weight: 85.4 kg (188 lb 3.2 oz)  SpO2: 98 %  Physical Exam  Vitals and nursing note reviewed.   Constitutional:       General: She is not in acute distress.     Appearance: Normal appearance. She is not ill-appearing or toxic-appearing.   HENT:      Head: Normocephalic and atraumatic.      Nose: Nose normal.      Mouth/Throat:      Mouth: Mucous membranes are moist.   Eyes:      General: Lids are normal. Lids are everted, no foreign bodies appreciated. Vision grossly intact. Gaze aligned  appropriately. No scleral icterus.        Right eye: No discharge.      Extraocular Movements: Extraocular movements intact.      Conjunctiva/sclera: Conjunctivae normal.      Pupils: Pupils are equal, round, and reactive to light.      Funduscopic exam:     Right eye: No papilledema.         Left eye: No papilledema.   Cardiovascular:      Rate and Rhythm: Normal rate.      Pulses: Normal pulses.      Heart sounds: Normal heart sounds.   Pulmonary:      Effort: Pulmonary effort is normal. No respiratory distress.      Breath sounds: Normal breath sounds.   Abdominal:      General: Abdomen is flat. There is no distension.   Musculoskeletal:         General: No swelling or deformity. Normal range of motion.      Cervical back: Normal range of motion and neck supple. No rigidity or tenderness.   Skin:     General: Skin is warm.      Capillary Refill: Capillary refill takes less than 2 seconds.   Neurological:      General: No focal deficit present.      Mental Status: She is alert and oriented to person, place, and time. Mental status is at baseline.      Cranial Nerves: No cranial nerve deficit.      Sensory: No sensory deficit.      Motor: No weakness.      Coordination: Coordination normal.      Gait: Gait normal.   Psychiatric:         Mood and Affect: Mood normal.       ED Course      Procedures  Results for orders placed during the hospital encounter of 11/07/22    POC US SOFT TISSUE    Impression  Limited Ocular Tissue Ultrasound, performed and interpreted by me.    Indication:  Headache.  Hx of possible IIH.  Evaluation fro papilledema    Body location: B/L Eyes    Findings:  There is no sign of retinal detachment.  No vitreous hemorrhage.  No foreign body.  No optic disc elevation.  Optic nerve sheath diameter is 3mm on R and 4mm on L    IMPRESSION: No papilledema.      Of note:  Unable to transfer images from US to PACS/[employer]       The medical record was reviewed and interpreted.  Managed outpatient  prescription medications.              Results for orders placed or performed during the hospital encounter of 11/07/22   POC US SOFT TISSUE     Status: None    Impression    Limited Ocular Tissue Ultrasound, performed and interpreted by me.    Indication:  Headache.  Hx of possible IIH.  Evaluation fro papilledema    Body location: B/L Eyes    Findings:  There is no sign of retinal detachment.  No vitreous hemorrhage.  No foreign body.  No optic disc elevation.  Optic nerve sheath diameter is 3mm on R and 4mm on L    IMPRESSION: No papilledema.      Of note:  Unable to transfer images from US to PACS/[facility]         Medications   ketorolac (TORADOL) injection 15 mg (15 mg Intravenous Given 11/7/22 0626)   diphenhydrAMINE (BENADRYL) injection 25 mg (25 mg Intravenous Given 11/7/22 0625)   metoclopramide (REGLAN) injection 5 mg (5 mg Intravenous Given 11/7/22 0623)   0.9% sodium chloride BOLUS (0 mLs Intravenous Stopped 11/7/22 0713)        Assessments & Plan (with Medical Decision Making)   Patient presents to the ED for evaluation of generalized headache.  Has a history of chronic migraines.  Also has a history of potential idiopathic intracranial hypertension.  Last LP yielded a opening pressure of 22 which is equivocal.      Patient symptoms are consistent with her usual migraines.  Given recent imaging of CTV, MRI, CT head, I have a low suspicion for more serious etiology such as intracranial hemorrhage, mass, dural venous sinus thrombosis, etc.  She denies any neck stiffness or infectious symptoms and overall appears well.  Low suspicion for meningitis/encephalitis.    Idiopathic intracranial hypertension remains a diagnostic possibility.  On fundoscopic exam, there is no obvious papilledema.  On ocular ultrasound, the right optic nerve sheath diameter is 3 mm and the left optic nerve sheath diameter is 4 mm, further decreasing likelihood of papilledema and increased intracranial hypertension.    Normal neuro  exam.    She was given a migraine cocktail.  On reassessment, her symptoms have improved.  She has appointment to follow-up with neurology in 2 days.  Was discharged with prescription for diphenhydramine, Reglan, and Toradol.  We discussed the absolute necessity of establishing with a single neurology service as she had been previously seeking care at multiple institutions.      All findings and plan of care discussed with the patient.  Educated reasons to return to the ED.    I have reviewed the nursing notes. I have reviewed the findings, diagnosis, plan and need for follow up with the patient.    Discharge Medication List as of 11/7/2022  7:15 AM      START taking these medications    Details   diphenhydrAMINE (BENADRYL) 25 MG tablet Take 1 tablet (25 mg) by mouth every 6 hours as needed for itching or allergies, Disp-30 tablet, R-0, Local Print      ketorolac (TORADOL) 10 MG tablet Take 1 tablet (10 mg) by mouth every 6 hours as needed for moderate pain, Disp-20 tablet, R-0, Local Print      metoclopramide (REGLAN) 5 MG tablet Take 1 tablet (5 mg) by mouth 4 times daily (before meals and nightly), Disp-20 tablet, R-0, Local Print             Final diagnoses:   Migraine without aura and without status migrainosus, not intractable       --  Larry Joyce DO  [facility] EMERGENCY DEPARTMENT  11/7/2022     Larry Joyce DO  11/08/22 0128

## 2022-11-07 NOTE — DISCHARGE INSTRUCTIONS
Be sure to take your medications as previously prescribed by your neurology team.  Very important that you go to your next follow-up appointment on 11/9/2022.  I have prescribed some additional medications that you received in the emergency department that can help with your symptoms.  Return to the ED if you develop any new or worsening headache.

## 2022-11-08 ASSESSMENT — ENCOUNTER SYMPTOMS
ARTHRALGIAS: 0
HEADACHES: 1
ABDOMINAL PAIN: 0
CHILLS: 0
ABDOMINAL DISTENTION: 0
SHORTNESS OF BREATH: 0
FEVER: 0
DYSURIA: 0
DIARRHEA: 0
VOMITING: 0
NECK PAIN: 0
COLOR CHANGE: 0
PALPITATIONS: 0
DIFFICULTY URINATING: 0
NAUSEA: 0
WEAKNESS: 0
CHEST TIGHTNESS: 0
DIZZINESS: 0
EYE PAIN: 0
MYALGIAS: 0
CONSTIPATION: 0
SORE THROAT: 0
CONFUSION: 0
FREQUENCY: 0
COUGH: 0
FATIGUE: 0
BACK PAIN: 0

## 2022-11-08 ASSESSMENT — VISUAL ACUITY: OU: 1

## 2023-01-29 ENCOUNTER — APPOINTMENT (OUTPATIENT)
Dept: GENERAL RADIOLOGY | Facility: CLINIC | Age: 43
End: 2023-01-29
Attending: EMERGENCY MEDICINE
Payer: COMMERCIAL

## 2023-01-29 ENCOUNTER — HOSPITAL ENCOUNTER (EMERGENCY)
Facility: CLINIC | Age: 43
Discharge: HOME | End: 2023-01-29
Attending: EMERGENCY MEDICINE | Admitting: EMERGENCY MEDICINE
Payer: COMMERCIAL

## 2023-01-29 VITALS
BODY MASS INDEX: 27.82 KG/M2 | OXYGEN SATURATION: 100 % | SYSTOLIC BLOOD PRESSURE: 113 MMHG | WEIGHT: 194.3 LBS | TEMPERATURE: 98 F | RESPIRATION RATE: 16 BRPM | HEIGHT: 70 IN | DIASTOLIC BLOOD PRESSURE: 77 MMHG | HEART RATE: 79 BPM

## 2023-01-29 DIAGNOSIS — R07.89 OTHER CHEST PAIN: ICD-10-CM

## 2023-01-29 DIAGNOSIS — R07.9 CHEST PAIN, UNSPECIFIED TYPE: ICD-10-CM

## 2023-01-29 LAB
ALBUMIN SERPL-MCNC: 3.7 G/DL (ref 3.4–5)
ALP SERPL-CCNC: 229 U/L (ref 40–150)
ALT SERPL W P-5'-P-CCNC: 48 U/L (ref 0–50)
ANION GAP SERPL CALCULATED.3IONS-SCNC: 6 MMOL/L (ref 3–14)
AST SERPL W P-5'-P-CCNC: 50 U/L (ref 0–45)
ATRIAL RATE - MUSE: 78 BPM
BASOPHILS # BLD MANUAL: 0 10E3/UL (ref 0–0.2)
BASOPHILS NFR BLD MANUAL: 0 %
BILIRUB SERPL-MCNC: 0.3 MG/DL (ref 0.2–1.3)
BUN SERPL-MCNC: 9 MG/DL (ref 7–30)
CALCIUM SERPL-MCNC: 8.8 MG/DL (ref 8.5–10.1)
CHLORIDE BLD-SCNC: 105 MMOL/L (ref 94–109)
CO2 SERPL-SCNC: 24 MMOL/L (ref 20–32)
CREAT SERPL-MCNC: 0.57 MG/DL (ref 0.52–1.04)
D DIMER PPP FEU-MCNC: 0.28 UG/ML FEU (ref 0–0.5)
DIASTOLIC BLOOD PRESSURE - MUSE: NORMAL MMHG
EOSINOPHIL # BLD MANUAL: 0.4 10E3/UL (ref 0–0.7)
EOSINOPHIL NFR BLD MANUAL: 4 %
ERYTHROCYTE [DISTWIDTH] IN BLOOD BY AUTOMATED COUNT: 16.7 % (ref 10–15)
FLUAV RNA SPEC QL NAA+PROBE: NEGATIVE
FLUBV RNA RESP QL NAA+PROBE: NEGATIVE
GFR SERPL CREATININE-BSD FRML MDRD: >90 ML/MIN/1.73M2
GLUCOSE BLD-MCNC: 75 MG/DL (ref 70–99)
HCG SERPL QL: NEGATIVE
HCT VFR BLD AUTO: 39 % (ref 35–47)
HGB BLD-MCNC: 12.1 G/DL (ref 11.7–15.7)
INTERPRETATION ECG - MUSE: NORMAL
LYMPHOCYTES # BLD MANUAL: 3.3 10E3/UL (ref 0.8–5.3)
LYMPHOCYTES NFR BLD MANUAL: 36 %
MCH RBC QN AUTO: 24.4 PG (ref 26.5–33)
MCHC RBC AUTO-ENTMCNC: 31 G/DL (ref 31.5–36.5)
MCV RBC AUTO: 79 FL (ref 78–100)
MONOCYTES # BLD MANUAL: 0.4 10E3/UL (ref 0–1.3)
MONOCYTES NFR BLD MANUAL: 4 %
NEUTROPHILS # BLD MANUAL: 5.2 10E3/UL (ref 1.6–8.3)
NEUTROPHILS NFR BLD MANUAL: 56 %
P AXIS - MUSE: 49 DEGREES
PLAT MORPH BLD: NORMAL
PLATELET # BLD AUTO: 220 10E3/UL (ref 150–450)
POTASSIUM BLD-SCNC: 4 MMOL/L (ref 3.4–5.3)
PR INTERVAL - MUSE: 152 MS
PROT SERPL-MCNC: 8.8 G/DL (ref 6.8–8.8)
QRS DURATION - MUSE: 70 MS
QT - MUSE: 402 MS
QTC - MUSE: 458 MS
R AXIS - MUSE: 15 DEGREES
RBC # BLD AUTO: 4.96 10E6/UL (ref 3.8–5.2)
RBC MORPH BLD: NORMAL
RSV RNA SPEC NAA+PROBE: NEGATIVE
SARS-COV-2 RNA RESP QL NAA+PROBE: NEGATIVE
SODIUM SERPL-SCNC: 135 MMOL/L (ref 133–144)
SYSTOLIC BLOOD PRESSURE - MUSE: NORMAL MMHG
T AXIS - MUSE: 27 DEGREES
TROPONIN I SERPL HS-MCNC: 21 NG/L
VENTRICULAR RATE- MUSE: 78 BPM
WBC # BLD AUTO: 9.3 10E3/UL (ref 4–11)

## 2023-01-29 PROCEDURE — 87637 SARSCOV2&INF A&B&RSV AMP PRB: CPT | Performed by: EMERGENCY MEDICINE

## 2023-01-29 PROCEDURE — 250N000013 HC RX MED GY IP 250 OP 250 PS 637: Performed by: EMERGENCY MEDICINE

## 2023-01-29 PROCEDURE — 85014 HEMATOCRIT: CPT | Performed by: EMERGENCY MEDICINE

## 2023-01-29 PROCEDURE — 99285 EMERGENCY DEPT VISIT HI MDM: CPT | Mod: CS,25

## 2023-01-29 PROCEDURE — 93005 ELECTROCARDIOGRAM TRACING: CPT

## 2023-01-29 PROCEDURE — 80053 COMPREHEN METABOLIC PANEL: CPT | Performed by: EMERGENCY MEDICINE

## 2023-01-29 PROCEDURE — 93010 ELECTROCARDIOGRAM REPORT: CPT | Performed by: EMERGENCY MEDICINE

## 2023-01-29 PROCEDURE — 71046 X-RAY EXAM CHEST 2 VIEWS: CPT

## 2023-01-29 PROCEDURE — 36415 COLL VENOUS BLD VENIPUNCTURE: CPT | Performed by: EMERGENCY MEDICINE

## 2023-01-29 PROCEDURE — 85007 BL SMEAR W/DIFF WBC COUNT: CPT | Performed by: EMERGENCY MEDICINE

## 2023-01-29 PROCEDURE — 99285 EMERGENCY DEPT VISIT HI MDM: CPT | Mod: CS | Performed by: EMERGENCY MEDICINE

## 2023-01-29 PROCEDURE — 85379 FIBRIN DEGRADATION QUANT: CPT | Performed by: EMERGENCY MEDICINE

## 2023-01-29 PROCEDURE — C9803 HOPD COVID-19 SPEC COLLECT: HCPCS

## 2023-01-29 PROCEDURE — 84484 ASSAY OF TROPONIN QUANT: CPT | Performed by: EMERGENCY MEDICINE

## 2023-01-29 PROCEDURE — 84703 CHORIONIC GONADOTROPIN ASSAY: CPT | Performed by: EMERGENCY MEDICINE

## 2023-01-29 RX ORDER — ACETAMINOPHEN 500 MG
1000 TABLET ORAL ONCE
Status: COMPLETED | OUTPATIENT
Start: 2023-01-29 | End: 2023-01-29

## 2023-01-29 RX ADMIN — ACETAMINOPHEN 1000 MG: 500 TABLET ORAL at 03:41

## 2023-01-29 ASSESSMENT — ACTIVITIES OF DAILY LIVING (ADL): ADLS_ACUITY_SCORE: 35

## 2023-01-29 NOTE — ED PROVIDER NOTES
[facility] EMERGENCY DEPARTMENT ([facility])     January 29, 2023     History     Chief Complaint   Patient presents with     Back Pain     Chest Pain     Upper and back and chest pain. Fever 102 at home. Took ibuprofen 30 min ago.     ALEX Egan is a 42 year old female with a past medical history including cervicalgia who presents to the Emergency Department for evaluation of back pain and chest pain. Patient reports roughly 2 weeks of nonradiating bilateral upper back pain that has been worse for the past 3-4 days. States that the pain worsens with cold. In addition she reports 2 days of chest pain with associated wheezing and generalized body aches.  She took ibuprofen at home with no pain relief, states she last took ibuprofen 30 minutes prior to arrival.  She denies a history of asthma, though she does note a history of pneumonia.    Otherwise, patient denies recent falls or injuries.  Denies sick contacts.  No cough, leg swelling, vomiting, abdominal pain, or urinary symptoms.    Past Medical History  No past medical history on file.  No past surgical history on file.  diphenhydrAMINE (BENADRYL) 25 MG tablet  ketorolac (TORADOL) 10 MG tablet  metoclopramide (REGLAN) 5 MG tablet  prazosin (MINIPRESS) 1 MG capsule  QUEtiapine (SEROQUEL) 25 MG tablet  topiramate (TOPAMAX) 25 MG tablet  traZODone (DESYREL) 50 MG tablet      Allergies   Allergen Reactions     Chicken-Derived Products (Egg) Nausea and Vomiting     Nuts Itching     Family History  No family history on file.  Social History   Social History     Tobacco Use     Smoking status: Never     Smokeless tobacco: Never   Substance Use Topics     Alcohol use: No     Drug use: No      Past medical history, past surgical history, medications, allergies, family history, and social history were reviewed with the patient. No additional pertinent items.      A medically appropriate review of systems was performed with pertinent positives and  "negatives noted in the HPI, and all other systems negative.    Physical Exam   BP: 113/77  Pulse: 79  Temp: 98  F (36.7  C)  Resp: 16  Height: 177.8 cm (5' 10\")  Weight: 88.1 kg (194 lb 4.8 oz)  SpO2: 100 %  Physical Exam  Physical Exam   Constitutional: oriented to person, place, and time. appears well-developed and well-nourished.   HENT:   Head: Normocephalic and atraumatic.   Neck: Normal range of motion. No tenderness over the C-spine.  Pulmonary/Chest: Effort normal. No respiratory distress. Clear lungs.  Tenderness palpation over the parasternal borders bilaterally.  Cardiac: No murmurs, rubs, gallops. RRR.  Radial pulses 2+ and symmetric.  Abdominal: Abdomen soft, nontender, nondistended. No rebound tenderness.  MSK: Long bones without deformity or evidence of trauma.  No lower extreme edema.  No tenderness over the calves.  No tenderness over the thoracic or lumbar spine.  Tenderness over the paraspinous muscles over the thoracic area.  Neurological: alert and oriented to person, place, and time. , bicep, tricep strength symmetric.  Gait normal.  Sensation intact in all extremities.  Skin: Skin is warm and dry.       ED Course, Procedures, & Data     1:41 AM  The patient was seen and examined by Allen Pang MD in Room ED20.    Procedures       ED Course Selections:        EKG Interpretation:      Interpreted by Kwame Pang MD  Time reviewed: 0205  Symptoms at time of EKG: chest pain   Rhythm: normal sinus   Rate: normal  Axis: normal  Ectopy: none  Conduction: normal  ST Segments/ T Waves: No ST-T wave changes  Q Waves: none  Comparison to prior: No old EKG available    Clinical Impression: normal EKG                     Results for orders placed or performed during the hospital encounter of 01/29/23   Chest XR,  PA & LAT     Status: None    Narrative    EXAM: XR CHEST 2 VIEWS  LOCATION: [facility]  DATE/TIME: 1/29/2023 3:51 AM    INDICATION: Cough. " Shortness of breath. Chest pain.  COMPARISON: None.      Impression    IMPRESSION: Both lungs are clear. No adenopathy or effusion. Normal cardiac size and pulmonary vascularity. Mild degenerative changes thoracic spine. No prior x-ray available for comparison. Current study will serve as a baseline for future follow-up.   Asymptomatic Influenza A/B & SARS-CoV2 (COVID-19) Virus PCR Multiplex Nasopharyngeal     Status: Normal    Specimen: Nasopharyngeal; Swab   Result Value Ref Range    Influenza A PCR Negative Negative    Influenza B PCR Negative Negative    RSV PCR Negative Negative    SARS CoV2 PCR Negative Negative    Narrative    Testing was performed using the Xpert Xpress CoV2/Flu/RSV Assay on the Cepheid GeneXpert Instrument. This test should be ordered for the detection of SARS-CoV-2 and influenza viruses in individuals who meet clinical and/or epidemiological criteria. Test performance is unknown in asymptomatic patients. This test is for in vitro diagnostic use under the FDA EUA for laboratories certified under CLIA to perform high or moderate complexity testing. This test has not been FDA cleared or approved. A negative result does not rule out the presence of PCR inhibitors in the specimen or target RNA in concentration below the limit of detection for the assay. If only one viral target is positive but coinfection with multiple targets is suspected, the sample should be re-tested with another FDA cleared, approved, or authorized test, if coinfection would change clinical management. This test was validated by the [facility]. These laboratories are certified under the Clinical Laboratory Improvement Amendments of 1988 (CLIA-88) as qualified to perform high complexity laboratory testing.   Comprehensive metabolic panel     Status: Abnormal   Result Value Ref Range    Sodium 135 133 - 144 mmol/L    Potassium 4.0 3.4 - 5.3 mmol/L    Chloride 105 94 - 109 mmol/L    Carbon Dioxide (CO2) 24  20 - 32 mmol/L    Anion Gap 6 3 - 14 mmol/L    Urea Nitrogen 9 7 - 30 mg/dL    Creatinine 0.57 0.52 - 1.04 mg/dL    Calcium 8.8 8.5 - 10.1 mg/dL    Glucose 75 70 - 99 mg/dL    Alkaline Phosphatase 229 (H) 40 - 150 U/L    AST 50 (H) 0 - 45 U/L    ALT 48 0 - 50 U/L    Protein Total 8.8 6.8 - 8.8 g/dL    Albumin 3.7 3.4 - 5.0 g/dL    Bilirubin Total 0.3 0.2 - 1.3 mg/dL    GFR Estimate >90 >60 mL/min/1.73m2   Troponin I     Status: Normal   Result Value Ref Range    Troponin I High Sensitivity 21 <54 ng/L   D dimer quantitative     Status: Normal   Result Value Ref Range    D-Dimer Quantitative 0.28 0.00 - 0.50 ug/mL FEU    Narrative    This D-dimer assay is intended for use in conjunction with a clinical pretest probability assessment model to exclude pulmonary embolism (PE) and deep venous thrombosis (DVT) in outpatients suspected of PE or DVT. The cut-off value is 0.50 ug/mL FEU.   HCG qualitative pregnancy (blood)     Status: Normal   Result Value Ref Range    hCG Serum Qualitative Negative Negative   CBC with platelets and differential     Status: Abnormal   Result Value Ref Range    WBC Count 9.3 4.0 - 11.0 10e3/uL    RBC Count 4.96 3.80 - 5.20 10e6/uL    Hemoglobin 12.1 11.7 - 15.7 g/dL    Hematocrit 39.0 35.0 - 47.0 %    MCV 79 78 - 100 fL    MCH 24.4 (L) 26.5 - 33.0 pg    MCHC 31.0 (L) 31.5 - 36.5 g/dL    RDW 16.7 (H) 10.0 - 15.0 %    Platelet Count 220 150 - 450 10e3/uL   Manual Differential     Status: None   Result Value Ref Range    % Neutrophils 56 %    % Lymphocytes 36 %    % Monocytes 4 %    % Eosinophils 4 %    % Basophils 0 %    Absolute Neutrophils 5.2 1.6 - 8.3 10e3/uL    Absolute Lymphocytes 3.3 0.8 - 5.3 10e3/uL    Absolute Monocytes 0.4 0.0 - 1.3 10e3/uL    Absolute Eosinophils 0.4 0.0 - 0.7 10e3/uL    Absolute Basophils 0.0 0.0 - 0.2 10e3/uL    RBC Morphology Confirmed RBC Indices     Platelet Assessment  Automated Count Confirmed. Platelet morphology is normal.     Automated Count Confirmed.  Platelet morphology is normal.   EKG 12-lead, tracing only     Status: None (Preliminary result)   Result Value Ref Range    Systolic Blood Pressure  mmHg    Diastolic Blood Pressure  mmHg    Ventricular Rate 78 BPM    Atrial Rate 78 BPM    FL Interval 152 ms    QRS Duration 70 ms     ms    QTc 458 ms    P Axis 49 degrees    R AXIS 15 degrees    T Axis 27 degrees    Interpretation ECG Sinus rhythm  Normal ECG      CBC with platelets differential     Status: Abnormal    Narrative    The following orders were created for panel order CBC with platelets differential.  Procedure                               Abnormality         Status                     ---------                               -----------         ------                     CBC with platelets and d...[[id number]]  Abnormal            Final result               Manual Differential[[id number]]                              Final result                 Please view results for these tests on the individual orders.     Medications - No data to display  Labs Ordered and Resulted from Time of ED Arrival to Time of ED Departure - No data to display  No orders to display          Medical Decision Making  The patient's presentation is strongly suggestive of an acute illness with systemic symptoms.    The patient's evaluation involved:  review of external note(s) from 3+ sources (see separate area of note for details)  ordering and/or review of 3+ test(s) in this encounter (see separate area of note for details)  strong consideration of a test (CT chest) that was ultimately deferred    The patient's management involved limitations due to social determinants of health (language barrier).      Assessment & Plan    MDM  Patient presenting with back pain and chest pain.  The back pain seems to be chronic.  It is in the paraspinous muscles of the thoracic area.  She has no radiating symptoms, weakness, bowel or bladder incontinence, saddle anesthesia to suggest  epidural abscess, cauda equina or other acute process affecting the spine.  Not feel MRIs are necessary at this point.  Most likely musculoskeletal as it is reproducible and hurts with movement.    Regarding the chest wall pain this also seems to be reproducible and musculoskeletal.  EKG and troponin reassuring for ACS.  This been going on for several days I do not feel a repeat troponin is necessary.  We will suspicion for dissection due to reproducible symptoms, no hypertension or neurologic symptoms and well-appearing patient without tearing or ripping pain or radiation.  Low special pulmonary embolism with a negative D-dimer.  Chest x-ray without pneumothorax, pneumonia etc.  No retching to suggest esophageal rupture.  Patient does feel better after Tylenol, suggested continuing this at home.  Patient will follow up with her primary care provider if not improving.  She does have a mild cough, may be related to a virus.  No worsening when lying down, EKG does not suggest pericarditis or myocarditis.    I have reviewed the nursing notes. I have reviewed the findings, diagnosis, plan and need for follow up with the patient.    New Prescriptions    No medications on file       Final diagnoses:   Chest pain, unspecified type     I, Toya Cantu, am serving as a trained medical scribe to document services personally performed by Kwame Pang MD, based on the provider's statements to me.   I, Kwame Pang MD, was physically present and have reviewed and verified the accuracy of this note documented by Toya Cantu.    Kwame Pang MD  [facility] EMERGENCY DEPARTMENT  1/29/2023     Kwame Pang MD  01/29/23 0404

## 2023-01-29 NOTE — DISCHARGE INSTRUCTIONS
Please make an appointment to follow up with Your Primary Care Provider in 2-3 days if not improving.    Return to the emergency department if you develop worsening symptoms or if you have any further concerns    If Nadia has discomfort from fever or other pain, she can have:  Acetaminophen (Tylenol) every 6 hours as needed. Her dose is:    2 regular strength tabs (650 mg)                                     (43.2+ kg/96+ lb)    NOTE: If your acetaminophen (Tylenol) came with a dropper marked with 0.4 and 0.8 ml, call us ([phone]) or check with your doctor about the dose before using it.     AND/OR      Ibuprofen (Advil, Motrin) every 6 hours as needed. His/her dose is:    2 regular strength tabs (400 mg)                                                                         (40-60 kg/ lb)

## 2023-01-29 NOTE — ED TRIAGE NOTES
Triage Assessment     Row Name 01/29/23 0136       Triage Assessment (Adult)    Airway WDL WDL       Respiratory WDL    Respiratory WDL WDL

## 2024-04-24 NOTE — TELEPHONE ENCOUNTER
[facility] Call Center    Phone Message    May a detailed message be left on voicemail: yes     Reason for Call: Other: Patient is calling to follow up from medication concerns in message below. Patient would like a call back to discuss as soon as possible at [phone]     Action Taken: Other: [facility]    Travel Screening: Not Applicable                                                                       Your provider has ordered testing for further evaluation.  An order/prescription has been included in your paper work.   To schedule outpatient testing, contact Central Scheduling by calling [employer] ([phone]).    Continue aspirin 81 mg daily, atorvastatin 80 mg qHS, coreg 6.25 mg BID, spironolactone 12.5 mg daily, valsartan 40 mg BID, and lasix 20 mg daily.   Order limited TTE to re-evaluate cardiac function. Order in chart from previous visit   Continue to encourage heart healthy, low sodium diet and regular physical exercise for at least 30 minutes a minimum of 3-5 times per week.  Follow up with me in 6 months

## 2025-03-08 ENCOUNTER — APPOINTMENT (OUTPATIENT)
Dept: CT IMAGING | Facility: CLINIC | Age: 45
End: 2025-03-08
Attending: EMERGENCY MEDICINE
Payer: COMMERCIAL

## 2025-03-08 ENCOUNTER — HOSPITAL ENCOUNTER (EMERGENCY)
Facility: CLINIC | Age: 45
Discharge: HOME | End: 2025-03-08
Attending: EMERGENCY MEDICINE
Payer: COMMERCIAL

## 2025-03-08 VITALS
OXYGEN SATURATION: 99 % | BODY MASS INDEX: 28.52 KG/M2 | RESPIRATION RATE: 16 BRPM | HEART RATE: 80 BPM | SYSTOLIC BLOOD PRESSURE: 97 MMHG | TEMPERATURE: 98.3 F | DIASTOLIC BLOOD PRESSURE: 59 MMHG | HEIGHT: 71 IN | WEIGHT: 203.7 LBS

## 2025-03-08 DIAGNOSIS — M54.2 CERVICALGIA: ICD-10-CM

## 2025-03-08 LAB
HCG UR QL: NEGATIVE
INTERNAL QC OK POCT: NORMAL
POCT KIT EXPIRATION DATE: NORMAL
POCT KIT LOT NUMBER: NORMAL

## 2025-03-08 PROCEDURE — 81025 URINE PREGNANCY TEST: CPT | Performed by: EMERGENCY MEDICINE

## 2025-03-08 PROCEDURE — 99283 EMERGENCY DEPT VISIT LOW MDM: CPT | Performed by: EMERGENCY MEDICINE

## 2025-03-08 PROCEDURE — 99284 EMERGENCY DEPT VISIT MOD MDM: CPT | Mod: 25 | Performed by: EMERGENCY MEDICINE

## 2025-03-08 PROCEDURE — 72125 CT NECK SPINE W/O DYE: CPT

## 2025-03-08 RX ORDER — BUPIVACAINE HYDROCHLORIDE AND EPINEPHRINE 5; 5 MG/ML; UG/ML
10 INJECTION, SOLUTION EPIDURAL; INTRACAUDAL; PERINEURAL ONCE
Status: DISCONTINUED | OUTPATIENT
Start: 2025-03-08 | End: 2025-03-08 | Stop reason: HOSPADM

## 2025-03-08 RX ORDER — LIDOCAINE 4 G/G
1 PATCH TOPICAL EVERY 24 HOURS
Qty: 10 PATCH | Refills: 0 | Status: SHIPPED | OUTPATIENT
Start: 2025-03-08

## 2025-03-08 RX ORDER — CYCLOBENZAPRINE HCL 5 MG
5 TABLET ORAL
Qty: 15 TABLET | Refills: 0 | Status: SHIPPED | OUTPATIENT
Start: 2025-03-08

## 2025-03-08 RX ORDER — ETHYL CHLORIDE 100 %
AEROSOL, SPRAY (ML) TOPICAL ONCE
Status: DISCONTINUED | OUTPATIENT
Start: 2025-03-08 | End: 2025-03-08

## 2025-03-08 ASSESSMENT — COLUMBIA-SUICIDE SEVERITY RATING SCALE - C-SSRS
2. HAVE YOU ACTUALLY HAD ANY THOUGHTS OF KILLING YOURSELF IN THE PAST MONTH?: NO
6. HAVE YOU EVER DONE ANYTHING, STARTED TO DO ANYTHING, OR PREPARED TO DO ANYTHING TO END YOUR LIFE?: NO
1. IN THE PAST MONTH, HAVE YOU WISHED YOU WERE DEAD OR WISHED YOU COULD GO TO SLEEP AND NOT WAKE UP?: NO

## 2025-03-08 ASSESSMENT — ACTIVITIES OF DAILY LIVING (ADL)
ADLS_ACUITY_SCORE: 41

## 2025-03-08 NOTE — ED NOTES
Patient is complaining of a headache. She also states the back of her neck is swollen and painful. Writer did notice it is swollen and  is more tender when touched. Pt states she took about 1000mg of extra strength tyelnol at 0040.

## 2025-03-08 NOTE — ED PROVIDER NOTES
"ED Provider Note  [facility]      History     Chief Complaint   Patient presents with    Headache     Patient c/o headache and posterior neck pain for about 2 weeks now. Patient said she gets temporary relief from Ibuprofen but after 6 hours of taking medication she's in pain again.      HPI  Nadia Egan is a 44 year old female who presents with neck pain.   Reports pain has been significant for at least 2 weeks. Has tried ibuprofen that does improve the pain temporarily but does not fully resolve it. Has also tried massage and muscle relaxer.   No falls or trauma to the neck.   Has noted a sensation of swelling of the skin in the affected area, without a rash or wound noted.   Pain then radiates to the bilateral occipital area.      Epic review demonstrates prior MRI cervical spine in 2022 at [facility] showing C4-5 and C5-6 disc bulge without neural foraminal or spinal canal stenosis.     Past Medical History  History reviewed. No pertinent past medical history.  History reviewed. No pertinent surgical history.  diphenhydrAMINE (BENADRYL) 25 MG tablet  ketorolac (TORADOL) 10 MG tablet  metoclopramide (REGLAN) 5 MG tablet  prazosin (MINIPRESS) 1 MG capsule  QUEtiapine (SEROQUEL) 25 MG tablet  topiramate (TOPAMAX) 25 MG tablet  traZODone (DESYREL) 50 MG tablet      Allergies   Allergen Reactions    Chicken-Derived Products (Egg) Nausea and Vomiting    Nuts Itching     Family History  History reviewed. No pertinent family history.  Social History   Social History     Tobacco Use    Smoking status: Never    Smokeless tobacco: Never   Substance Use Topics    Alcohol use: No    Drug use: No      A medically appropriate review of systems was performed with pertinent positives and negatives noted in the HPI, and all other systems negative.    Physical Exam   BP: 131/84  Pulse: 88  Temp: 98.3  F (36.8  C)  Resp: 16  Height: 180.3 cm (5' 11\")  Weight: 92.4 kg (203 lb 11.2 oz)  SpO2: 99 %    Physical " Exam  Gen:A&Ox3, no acute distress  HEENT:PERRL, no facial tenderness or wounds, head atraumatic, oropharynx clear, mucous membranes moist, TMs clear bilaterally  Neck: midline tenderness of mid neck with mild soft tissue swelling. No erythema or wound/rash.   Back: no CVA tenderness, no midline bony tenderness  CV:RRR without murmurs  PULM:Clear to auscultation bilaterally  Abd:soft, nontender, nondistended. Bowel sounds present and normal  UE:No traumatic injuries, skin normal  LE:no traumatic injuries, skin normal  Neuro:CN II-XII intact, strength 5/5 of flexion and extension of the toes, ankles, knees, hips, hands, wrists, elbows and shoulders.  Sensation intact to touch throughout. Coordination normal on finger to nose testing. Gait stable.   Skin: no rashes or ecchymoses    ED Course, Procedures, & Data      Procedures       No results found for any visits on 03/08/25.  Medications - No data to display  Labs Ordered and Resulted from Time of ED Arrival to Time of ED Departure - No data to display  No orders to display          {Critical Care Performed?:959128}    Assessment & Plan    45 yo F presenting with 2 weeks of neck pain.   Vitals stable.   Neuro exam without focal deficits.   Imaging done due to mild swelling, notable for ***    I have reviewed the nursing notes. I have reviewed the findings, diagnosis, plan and need for follow up with the patient.    New Prescriptions    No medications on file       Final diagnoses:   None       Precious Ya MD   [facility] EMERGENCY DEPARTMENT  3/8/2025   Impression    IMPRESSION:  1.  No evidence of an acute cervical spine fracture.  2.  Good anatomic alignment and vertebral body heights maintained.  3.  No significant canal compromise or neural foraminal narrowing throughout cervical spine.     hCG qual urine POCT     Status: Normal   Result Value Ref Range    HCG Qual Urine Negative Negative    Internal QC Check POCT Valid Valid    POCT Kit Lot Number [id number]     POCT Kit Expiration Date 2026-09-27      Medications - No data to display  Labs Ordered and Resulted from Time of ED Arrival to Time of ED Departure   HCG QUALITATIVE URINE POCT - Normal       Result Value    HCG Qual Urine Negative      Internal QC Check POCT Valid      POCT Kit Lot Number [id number]      POCT Kit Expiration Date 2026-09-27       CT Cervical Spine w/o Contrast   Final Result   IMPRESSION:   1.  No evidence of an acute cervical spine fracture.   2.  Good anatomic alignment and vertebral body heights maintained.   3.  No significant canal compromise or neural foraminal narrowing throughout cervical spine.                Critical care was not performed.     Medical Decision Making  The patient's presentation was of moderate complexity (a chronic illness mild to moderate exacerbation, progression, or side effect of treatment).    The patient's evaluation involved:  review of 1 test result(s) ordered prior to this encounter (see separate area of note for details)    The patient's management necessitated moderate risk (a decision regarding minor procedure (nerve block) with risk factors of none).    Assessment & Plan    45 yo F presenting with 2 weeks of neck pain.   Vitals stable.   Neuro exam without focal deficits.   Imaging done due to mild swelling, notable for no significant canal or foramental stenosis. No bony fractures. No soft tissue abnormalities.   Treated with bilateral paracervical nerve block with 0.5% bupivicaine for pain management.   Discharged with ongoing supportive care with  tylenol, flexeril and lidocaine patches after nerve block wears off.   Follow up with Primary Care for reassessment of symptoms and discussion of possible PT.     I have reviewed the nursing notes. I have reviewed the findings, diagnosis, plan and need for follow up with the patient.    Discharge Medication List as of 3/8/2025  6:32 AM        START taking these medications    Details   cyclobenzaprine (FLEXERIL) 5 MG tablet Take 1 tablet (5 mg) by mouth nightly as needed for muscle spasms., Disp-15 tablet, R-0, E-Prescribe      Lidocaine (LIDOCARE) 4 % Patch Place 1 patch over 12 hours onto the skin every 24 hours. To prevent lidocaine toxicity, patient should be patch free for 12 hrs daily.Disp-10 patch, [id number]             Final diagnoses:   Cervicalgia       Precious Ya MD   [facility] EMERGENCY DEPARTMENT  3/8/2025     Precious Ya MD  03/16/25 1013

## 2025-03-08 NOTE — DISCHARGE INSTRUCTIONS
Thank you for coming to the [facility] Emergency Department.     CT today shows no fractures or nerve compression in the neck. 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     For pain, you were given a nerve block injection.   Expect this to last for several hours.   Tomorrow, if pain returns, apply warm packs to the neck. Stretch and massage the tight muscles.   Use ibuprofen 400mg every 6-8 hours.  Beginning tomorrow, you can apply a lidocaine patch for 12 hours, then remove it for 12 hours, to help pain. I recommend applying this before bed if unable to sleep due to pain.   You may also take the muscle relaxer, flexeril before bed    Please follow up with your primary care clinic in the next week if not improved and discuss with them about referral to physical therapy to help with your pain.

## 2025-03-08 NOTE — ED TRIAGE NOTES
Patient c/o headache and posterior neck pain for about 2 weeks now. Patient said she gets temporary relief from Ibuprofen but after 6 hours of taking medication she's in pain again.      Triage Assessment (Adult)       Row Name 03/08/25 0150          Triage Assessment    Airway WDL WDL        Respiratory WDL    Respiratory WDL WDL        Skin Circulation/Temperature WDL    Skin Circulation/Temperature WDL WDL        Cardiac WDL    Cardiac WDL WDL        Peripheral/Neurovascular WDL    Peripheral Neurovascular WDL WDL        Cognitive/Neuro/Behavioral WDL    Cognitive/Neuro/Behavioral WDL WDL

## 2025-08-17 ENCOUNTER — APPOINTMENT (OUTPATIENT)
Dept: CT IMAGING | Facility: CLINIC | Age: 45
End: 2025-08-17
Attending: EMERGENCY MEDICINE
Payer: COMMERCIAL

## 2025-08-17 ENCOUNTER — APPOINTMENT (OUTPATIENT)
Dept: MRI IMAGING | Facility: CLINIC | Age: 45
End: 2025-08-17
Attending: EMERGENCY MEDICINE
Payer: COMMERCIAL

## 2025-08-17 ENCOUNTER — HOSPITAL ENCOUNTER (EMERGENCY)
Facility: CLINIC | Age: 45
Discharge: HOME | End: 2025-08-18
Attending: EMERGENCY MEDICINE | Admitting: EMERGENCY MEDICINE
Payer: COMMERCIAL

## 2025-08-17 DIAGNOSIS — K52.9 GASTROENTERITIS: ICD-10-CM

## 2025-08-17 DIAGNOSIS — M54.42 ACUTE LEFT-SIDED LOW BACK PAIN WITH LEFT-SIDED SCIATICA: Primary | ICD-10-CM

## 2025-08-17 DIAGNOSIS — R10.9 ABDOMINAL PAIN, UNSPECIFIED ABDOMINAL LOCATION: ICD-10-CM

## 2025-08-17 LAB
ALBUMIN SERPL BCG-MCNC: 3.8 G/DL (ref 3.5–5.2)
ALP SERPL-CCNC: 317 U/L (ref 40–150)
ALT SERPL W P-5'-P-CCNC: 51 U/L (ref 0–50)
ANION GAP SERPL CALCULATED.3IONS-SCNC: 13 MMOL/L (ref 7–15)
AST SERPL W P-5'-P-CCNC: 48 U/L (ref 0–45)
BASOPHILS # BLD AUTO: 0.05 10E3/UL (ref 0–0.2)
BASOPHILS NFR BLD AUTO: 0.7 %
BILIRUB SERPL-MCNC: 0.4 MG/DL
BUN SERPL-MCNC: 5.7 MG/DL (ref 6–20)
CALCIUM SERPL-MCNC: 9 MG/DL (ref 8.8–10.4)
CHLORIDE SERPL-SCNC: 102 MMOL/L (ref 98–107)
CREAT SERPL-MCNC: 0.6 MG/DL (ref 0.51–0.95)
EGFRCR SERPLBLD CKD-EPI 2021: >90 ML/MIN/1.73M2
EOSINOPHIL # BLD AUTO: 0.22 10E3/UL (ref 0–0.7)
EOSINOPHIL NFR BLD AUTO: 2.9 %
ERYTHROCYTE [DISTWIDTH] IN BLOOD BY AUTOMATED COUNT: 18.1 % (ref 10–15)
GLUCOSE SERPL-MCNC: 99 MG/DL (ref 70–99)
HCG SERPL QL: NEGATIVE
HCO3 SERPL-SCNC: 23 MMOL/L (ref 22–29)
HCT VFR BLD AUTO: 34.3 % (ref 35–47)
HGB BLD-MCNC: 10.6 G/DL (ref 11.7–15.7)
IMM GRANULOCYTES # BLD: <0.03 10E3/UL
IMM GRANULOCYTES NFR BLD: 0.3 %
LIPASE SERPL-CCNC: 23 U/L (ref 13–60)
LYMPHOCYTES # BLD AUTO: 2.13 10E3/UL (ref 0.8–5.3)
LYMPHOCYTES NFR BLD AUTO: 28.3 %
MCH RBC QN AUTO: 23.5 PG (ref 26.5–33)
MCHC RBC AUTO-ENTMCNC: 30.9 G/DL (ref 31.5–36.5)
MCV RBC AUTO: 75.9 FL (ref 78–100)
MONOCYTES # BLD AUTO: 0.9 10E3/UL (ref 0–1.3)
MONOCYTES NFR BLD AUTO: 12 %
NEUTROPHILS # BLD AUTO: 4.2 10E3/UL (ref 1.6–8.3)
NEUTROPHILS NFR BLD AUTO: 55.8 %
NRBC # BLD AUTO: <0.03 10E3/UL
NRBC BLD AUTO-RTO: 0 /100
PLATELET # BLD AUTO: 301 10E3/UL (ref 150–450)
POTASSIUM SERPL-SCNC: 3.9 MMOL/L (ref 3.4–5.3)
PROT SERPL-MCNC: 8 G/DL (ref 6.4–8.3)
RBC # BLD AUTO: 4.52 10E6/UL (ref 3.8–5.2)
SODIUM SERPL-SCNC: 138 MMOL/L (ref 135–145)
WBC # BLD AUTO: 7.52 10E3/UL (ref 4–11)

## 2025-08-17 PROCEDURE — 250N000009 HC RX 250: Performed by: EMERGENCY MEDICINE

## 2025-08-17 PROCEDURE — 99285 EMERGENCY DEPT VISIT HI MDM: CPT | Mod: 25 | Performed by: EMERGENCY MEDICINE

## 2025-08-17 PROCEDURE — 74177 CT ABD & PELVIS W/CONTRAST: CPT

## 2025-08-17 PROCEDURE — 84703 CHORIONIC GONADOTROPIN ASSAY: CPT | Performed by: EMERGENCY MEDICINE

## 2025-08-17 PROCEDURE — 85025 COMPLETE CBC W/AUTO DIFF WBC: CPT | Performed by: EMERGENCY MEDICINE

## 2025-08-17 PROCEDURE — 36415 COLL VENOUS BLD VENIPUNCTURE: CPT | Performed by: EMERGENCY MEDICINE

## 2025-08-17 PROCEDURE — 99285 EMERGENCY DEPT VISIT HI MDM: CPT | Performed by: EMERGENCY MEDICINE

## 2025-08-17 PROCEDURE — 250N000011 HC RX IP 250 OP 636: Performed by: EMERGENCY MEDICINE

## 2025-08-17 PROCEDURE — 72148 MRI LUMBAR SPINE W/O DYE: CPT

## 2025-08-17 PROCEDURE — 83690 ASSAY OF LIPASE: CPT | Performed by: EMERGENCY MEDICINE

## 2025-08-17 PROCEDURE — 84155 ASSAY OF PROTEIN SERUM: CPT | Performed by: EMERGENCY MEDICINE

## 2025-08-17 RX ORDER — IOPAMIDOL 755 MG/ML
500 INJECTION, SOLUTION INTRAVASCULAR ONCE
Status: COMPLETED | OUTPATIENT
Start: 2025-08-17 | End: 2025-08-17

## 2025-08-17 RX ADMIN — SODIUM CHLORIDE 65 ML: 9 INJECTION, SOLUTION INTRAVENOUS at 23:43

## 2025-08-17 RX ADMIN — IOPAMIDOL 99 ML: 755 INJECTION, SOLUTION INTRAVENOUS at 23:38

## 2025-08-17 ASSESSMENT — ACTIVITIES OF DAILY LIVING (ADL)
ADLS_ACUITY_SCORE: 42
ADLS_ACUITY_SCORE: 42
ADLS_ACUITY_SCORE: 41
ADLS_ACUITY_SCORE: 41

## 2025-08-17 ASSESSMENT — COLUMBIA-SUICIDE SEVERITY RATING SCALE - C-SSRS
2. HAVE YOU ACTUALLY HAD ANY THOUGHTS OF KILLING YOURSELF IN THE PAST MONTH?: NO
1. IN THE PAST MONTH, HAVE YOU WISHED YOU WERE DEAD OR WISHED YOU COULD GO TO SLEEP AND NOT WAKE UP?: NO
6. HAVE YOU EVER DONE ANYTHING, STARTED TO DO ANYTHING, OR PREPARED TO DO ANYTHING TO END YOUR LIFE?: NO

## 2025-08-18 VITALS
HEART RATE: 62 BPM | DIASTOLIC BLOOD PRESSURE: 74 MMHG | RESPIRATION RATE: 16 BRPM | OXYGEN SATURATION: 98 % | SYSTOLIC BLOOD PRESSURE: 114 MMHG | TEMPERATURE: 97.4 F

## 2025-08-18 PROCEDURE — 250N000013 HC RX MED GY IP 250 OP 250 PS 637: Performed by: EMERGENCY MEDICINE

## 2025-08-18 RX ORDER — ACETAMINOPHEN 500 MG
500-1000 TABLET ORAL EVERY 6 HOURS PRN
Qty: 30 TABLET | Refills: 0 | Status: SHIPPED | OUTPATIENT
Start: 2025-08-18

## 2025-08-18 RX ORDER — ONDANSETRON 4 MG/1
4 TABLET, ORALLY DISINTEGRATING ORAL EVERY 8 HOURS PRN
Qty: 10 TABLET | Refills: 0 | Status: SHIPPED | OUTPATIENT
Start: 2025-08-18

## 2025-08-18 RX ORDER — CYCLOBENZAPRINE HCL 10 MG
10 TABLET ORAL 3 TIMES DAILY PRN
Qty: 15 TABLET | Refills: 0 | Status: SHIPPED | OUTPATIENT
Start: 2025-08-18

## 2025-08-18 RX ORDER — CYCLOBENZAPRINE HCL 10 MG
10 TABLET ORAL ONCE
Status: COMPLETED | OUTPATIENT
Start: 2025-08-18 | End: 2025-08-18

## 2025-08-18 RX ORDER — ACETAMINOPHEN 500 MG
1000 TABLET ORAL ONCE
Status: COMPLETED | OUTPATIENT
Start: 2025-08-18 | End: 2025-08-18

## 2025-08-18 RX ADMIN — ACETAMINOPHEN 1000 MG: 500 TABLET ORAL at 01:16

## 2025-08-18 RX ADMIN — CYCLOBENZAPRINE 10 MG: 10 TABLET, FILM COATED ORAL at 01:17

## 2025-08-18 ASSESSMENT — ACTIVITIES OF DAILY LIVING (ADL): ADLS_ACUITY_SCORE: 42
